# Patient Record
Sex: FEMALE | Race: WHITE | NOT HISPANIC OR LATINO | Employment: OTHER | URBAN - METROPOLITAN AREA
[De-identification: names, ages, dates, MRNs, and addresses within clinical notes are randomized per-mention and may not be internally consistent; named-entity substitution may affect disease eponyms.]

---

## 2017-04-06 ENCOUNTER — HISTORICAL (OUTPATIENT)
Dept: RADIOLOGY | Facility: HOSPITAL | Age: 52
End: 2017-04-06

## 2017-05-02 ENCOUNTER — HISTORICAL (OUTPATIENT)
Dept: RADIOLOGY | Facility: HOSPITAL | Age: 52
End: 2017-05-02

## 2017-05-09 ENCOUNTER — HISTORICAL (OUTPATIENT)
Dept: RADIOLOGY | Facility: HOSPITAL | Age: 52
End: 2017-05-09

## 2017-08-22 ENCOUNTER — HISTORICAL (OUTPATIENT)
Dept: INFUSION THERAPY | Facility: HOSPITAL | Age: 52
End: 2017-08-22

## 2017-08-23 ENCOUNTER — HISTORICAL (OUTPATIENT)
Dept: INFUSION THERAPY | Facility: HOSPITAL | Age: 52
End: 2017-08-23

## 2017-08-30 ENCOUNTER — HISTORICAL (OUTPATIENT)
Dept: INFUSION THERAPY | Facility: HOSPITAL | Age: 52
End: 2017-08-30

## 2017-09-12 ENCOUNTER — HISTORICAL (OUTPATIENT)
Dept: INFUSION THERAPY | Facility: HOSPITAL | Age: 52
End: 2017-09-12

## 2017-09-13 ENCOUNTER — HISTORICAL (OUTPATIENT)
Dept: INFUSION THERAPY | Facility: HOSPITAL | Age: 52
End: 2017-09-13

## 2017-09-19 ENCOUNTER — HISTORICAL (OUTPATIENT)
Dept: LAB | Facility: HOSPITAL | Age: 52
End: 2017-09-19

## 2017-10-03 ENCOUNTER — HISTORICAL (OUTPATIENT)
Dept: INFUSION THERAPY | Facility: HOSPITAL | Age: 52
End: 2017-10-03

## 2017-10-04 ENCOUNTER — HISTORICAL (OUTPATIENT)
Dept: INFUSION THERAPY | Facility: HOSPITAL | Age: 52
End: 2017-10-04

## 2017-10-24 ENCOUNTER — HISTORICAL (OUTPATIENT)
Dept: INFUSION THERAPY | Facility: HOSPITAL | Age: 52
End: 2017-10-24

## 2017-10-25 ENCOUNTER — HISTORICAL (OUTPATIENT)
Dept: INFUSION THERAPY | Facility: HOSPITAL | Age: 52
End: 2017-10-25

## 2017-12-01 ENCOUNTER — HISTORICAL (OUTPATIENT)
Dept: RADIOLOGY | Facility: HOSPITAL | Age: 52
End: 2017-12-01

## 2017-12-04 ENCOUNTER — HISTORICAL (OUTPATIENT)
Dept: INFUSION THERAPY | Facility: HOSPITAL | Age: 52
End: 2017-12-04

## 2017-12-04 LAB
ABS NEUT (OLG): 4.7 X10(3)/MCL (ref 1.5–6.9)
ALBUMIN SERPL-MCNC: 4.2 GM/DL (ref 3.4–5)
ALBUMIN/GLOB SERPL: 1.2 RATIO
ALP SERPL-CCNC: 70 UNIT/L (ref 30–113)
ALT SERPL-CCNC: 32 UNIT/L (ref 10–45)
AST SERPL-CCNC: 21 UNIT/L (ref 15–37)
BASOPHILS # BLD AUTO: 0.1 X10(3)/MCL (ref 0–0.1)
BASOPHILS NFR BLD AUTO: 1 % (ref 0–1)
BILIRUB SERPL-MCNC: 0.4 MG/DL (ref 0.1–0.9)
BILIRUBIN DIRECT+TOT PNL SERPL-MCNC: 0.2 MG/DL
BILIRUBIN DIRECT+TOT PNL SERPL-MCNC: 0.2 MG/DL (ref 0–0.3)
BUN SERPL-MCNC: 11 MG/DL (ref 10–20)
CALCIUM SERPL-MCNC: 9.1 MG/DL (ref 8–10.5)
CHLORIDE SERPL-SCNC: 102 MMOL/L (ref 100–108)
CO2 SERPL-SCNC: 30 MMOL/L (ref 21–35)
CREAT SERPL-MCNC: 0.48 MG/DL (ref 0.7–1.3)
EOSINOPHIL # BLD AUTO: 0.6 X10(3)/MCL (ref 0–0.6)
EOSINOPHIL NFR BLD AUTO: 8 % (ref 0–5)
ERYTHROCYTE [DISTWIDTH] IN BLOOD BY AUTOMATED COUNT: 13.6 % (ref 11.5–17)
GLOBULIN SER-MCNC: 3.4 GM/DL
GLUCOSE SERPL-MCNC: 64 MG/DL (ref 75–116)
HCT VFR BLD AUTO: 40.5 % (ref 36–48)
HGB BLD-MCNC: 13.4 GM/DL (ref 12–16)
LYMPHOCYTES # BLD AUTO: 1.5 X10(3)/MCL (ref 0.5–4.1)
LYMPHOCYTES NFR BLD AUTO: 20.2 % (ref 15–40)
MCH RBC QN AUTO: 31 PG (ref 27–34)
MCHC RBC AUTO-ENTMCNC: 33 GM/DL (ref 31–36)
MCV RBC AUTO: 93 FL (ref 80–99)
MONOCYTES # BLD AUTO: 0.6 X10(3)/MCL (ref 0–1.1)
MONOCYTES NFR BLD AUTO: 8 % (ref 4–12)
NEUTROPHILS # BLD AUTO: 4.7 X10(3)/MCL (ref 1.5–6.9)
NEUTROPHILS NFR BLD AUTO: 64 % (ref 43–75)
PLATELET # BLD AUTO: 295 X10(3)/MCL (ref 140–400)
PMV BLD AUTO: 9.1 FL (ref 6.8–10)
POTASSIUM SERPL-SCNC: 3.4 MMOL/L (ref 3.6–5.2)
PROT SERPL-MCNC: 7.6 GM/DL (ref 6.4–8.2)
RBC # BLD AUTO: 4.36 X10(6)/MCL (ref 4.2–5.4)
SODIUM SERPL-SCNC: 140 MMOL/L (ref 135–145)
WBC # SPEC AUTO: 7.4 X10(3)/MCL (ref 4.5–11.5)

## 2018-03-01 ENCOUNTER — HISTORICAL (OUTPATIENT)
Dept: LAB | Facility: HOSPITAL | Age: 53
End: 2018-03-01

## 2018-03-05 ENCOUNTER — HISTORICAL (OUTPATIENT)
Dept: INFUSION THERAPY | Facility: HOSPITAL | Age: 53
End: 2018-03-05

## 2018-05-29 ENCOUNTER — HISTORICAL (OUTPATIENT)
Dept: RADIOLOGY | Facility: HOSPITAL | Age: 53
End: 2018-05-29

## 2018-07-06 ENCOUNTER — HISTORICAL (OUTPATIENT)
Dept: RADIOLOGY | Facility: HOSPITAL | Age: 53
End: 2018-07-06

## 2018-08-14 ENCOUNTER — HISTORICAL (OUTPATIENT)
Dept: RADIOLOGY | Facility: HOSPITAL | Age: 53
End: 2018-08-14

## 2018-09-07 ENCOUNTER — HISTORICAL (OUTPATIENT)
Dept: INFUSION THERAPY | Facility: HOSPITAL | Age: 53
End: 2018-09-07

## 2018-12-11 ENCOUNTER — HISTORICAL (OUTPATIENT)
Dept: LAB | Facility: HOSPITAL | Age: 53
End: 2018-12-11

## 2019-01-15 ENCOUNTER — HISTORICAL (OUTPATIENT)
Dept: RADIOLOGY | Facility: HOSPITAL | Age: 54
End: 2019-01-15

## 2019-03-15 ENCOUNTER — HISTORICAL (OUTPATIENT)
Dept: INFUSION THERAPY | Facility: HOSPITAL | Age: 54
End: 2019-03-15

## 2019-03-15 ENCOUNTER — HISTORICAL (OUTPATIENT)
Dept: LAB | Facility: HOSPITAL | Age: 54
End: 2019-03-15

## 2019-06-12 ENCOUNTER — HISTORICAL (OUTPATIENT)
Dept: LAB | Facility: HOSPITAL | Age: 54
End: 2019-06-12

## 2019-06-13 ENCOUNTER — HISTORICAL (OUTPATIENT)
Dept: INFUSION THERAPY | Facility: HOSPITAL | Age: 54
End: 2019-06-13

## 2019-06-18 ENCOUNTER — HISTORICAL (OUTPATIENT)
Dept: ADMINISTRATIVE | Facility: HOSPITAL | Age: 54
End: 2019-06-18

## 2019-07-17 ENCOUNTER — HISTORICAL (OUTPATIENT)
Dept: RADIOLOGY | Facility: HOSPITAL | Age: 54
End: 2019-07-17

## 2019-09-13 ENCOUNTER — HISTORICAL (OUTPATIENT)
Dept: RADIOLOGY | Facility: HOSPITAL | Age: 54
End: 2019-09-13

## 2019-09-13 LAB
ABS NEUT (OLG): 6.7 X10(3)/MCL (ref 1.5–6.9)
ALBUMIN SERPL-MCNC: 4.3 GM/DL (ref 3.4–5)
ALBUMIN/GLOB SERPL: 1.1 RATIO
ALP SERPL-CCNC: 61 UNIT/L (ref 30–113)
ALT SERPL-CCNC: 19 UNIT/L (ref 10–45)
AST SERPL-CCNC: 19 UNIT/L (ref 15–37)
BASOPHILS # BLD AUTO: 0.1 X10(3)/MCL (ref 0–0.1)
BASOPHILS NFR BLD AUTO: 1 % (ref 0–1)
BILIRUB SERPL-MCNC: 0.3 MG/DL (ref 0.1–0.9)
BILIRUBIN DIRECT+TOT PNL SERPL-MCNC: 0.1 MG/DL (ref 0–0.3)
BILIRUBIN DIRECT+TOT PNL SERPL-MCNC: 0.2 MG/DL
BUN SERPL-MCNC: 10 MG/DL (ref 10–20)
CALCIUM SERPL-MCNC: 9.8 MG/DL (ref 8–10.5)
CHLORIDE SERPL-SCNC: 99 MMOL/L (ref 100–108)
CO2 SERPL-SCNC: 30 MMOL/L (ref 21–35)
CREAT SERPL-MCNC: 0.52 MG/DL (ref 0.7–1.3)
EOSINOPHIL # BLD AUTO: 0.2 X10(3)/MCL (ref 0–0.6)
EOSINOPHIL NFR BLD AUTO: 2 % (ref 0–5)
ERYTHROCYTE [DISTWIDTH] IN BLOOD BY AUTOMATED COUNT: 14.6 % (ref 11.5–17)
GLOBULIN SER-MCNC: 3.8 GM/DL
GLUCOSE SERPL-MCNC: 78 MG/DL (ref 75–116)
HCT VFR BLD AUTO: 47.9 % (ref 36–48)
HGB BLD-MCNC: 16 GM/DL (ref 12–16)
IMM GRANULOCYTES # BLD AUTO: 0.03 10*3/UL (ref 0–0.02)
IMM GRANULOCYTES NFR BLD AUTO: 0.3 % (ref 0–0.43)
LYMPHOCYTES # BLD AUTO: 2.2 X10(3)/MCL (ref 0.5–4.1)
LYMPHOCYTES NFR BLD AUTO: 22 % (ref 15–40)
MCH RBC QN AUTO: 30 PG (ref 27–34)
MCHC RBC AUTO-ENTMCNC: 33 GM/DL (ref 31–36)
MCV RBC AUTO: 90 FL (ref 80–99)
MONOCYTES # BLD AUTO: 0.6 X10(3)/MCL (ref 0–1.1)
MONOCYTES NFR BLD AUTO: 6 % (ref 4–12)
NEUTROPHILS # BLD AUTO: 6.7 X10(3)/MCL (ref 1.5–6.9)
NEUTROPHILS NFR BLD AUTO: 68 % (ref 43–75)
PLATELET # BLD AUTO: 304 X10(3)/MCL (ref 140–400)
PMV BLD AUTO: 9.3 FL (ref 6.8–10)
POTASSIUM SERPL-SCNC: 3.8 MMOL/L (ref 3.6–5.2)
PROT SERPL-MCNC: 8.1 GM/DL (ref 6.4–8.2)
RBC # BLD AUTO: 5.32 X10(6)/MCL (ref 4.2–5.4)
SODIUM SERPL-SCNC: 137 MMOL/L (ref 135–145)
WBC # SPEC AUTO: 9.9 X10(3)/MCL (ref 4.5–11.5)

## 2019-09-17 ENCOUNTER — HISTORICAL (OUTPATIENT)
Dept: INFUSION THERAPY | Facility: HOSPITAL | Age: 54
End: 2019-09-17

## 2019-12-16 ENCOUNTER — HISTORICAL (OUTPATIENT)
Dept: RADIOLOGY | Facility: HOSPITAL | Age: 54
End: 2019-12-16

## 2019-12-19 ENCOUNTER — HISTORICAL (OUTPATIENT)
Dept: INFUSION THERAPY | Facility: HOSPITAL | Age: 54
End: 2019-12-19

## 2020-03-17 ENCOUNTER — HISTORICAL (OUTPATIENT)
Dept: RADIOLOGY | Facility: HOSPITAL | Age: 55
End: 2020-03-17

## 2020-03-17 LAB — POC CREATININE: 0.5 MG/DL (ref 0.6–1.3)

## 2020-03-25 ENCOUNTER — HISTORICAL (OUTPATIENT)
Dept: LAB | Facility: HOSPITAL | Age: 55
End: 2020-03-25

## 2020-03-25 LAB
ABS NEUT (OLG): 6.2 X10(3)/MCL (ref 1.5–6.9)
ALBUMIN SERPL-MCNC: 4.3 GM/DL (ref 3.4–5)
ALBUMIN/GLOB SERPL: 1.3 RATIO
ALP SERPL-CCNC: 65 UNIT/L (ref 30–113)
ALT SERPL-CCNC: 25 UNIT/L (ref 10–45)
AST SERPL-CCNC: 19 UNIT/L (ref 15–37)
BASOPHILS # BLD AUTO: 0.1 X10(3)/MCL (ref 0–0.1)
BASOPHILS NFR BLD AUTO: 1 % (ref 0–1)
BILIRUB SERPL-MCNC: 0.3 MG/DL (ref 0.1–0.9)
BILIRUBIN DIRECT+TOT PNL SERPL-MCNC: 0.1 MG/DL (ref 0–0.3)
BILIRUBIN DIRECT+TOT PNL SERPL-MCNC: 0.2 MG/DL
BUN SERPL-MCNC: 8 MG/DL (ref 10–20)
CALCIUM SERPL-MCNC: 8.9 MG/DL (ref 8–10.5)
CHLORIDE SERPL-SCNC: 102 MMOL/L (ref 100–108)
CO2 SERPL-SCNC: 28 MMOL/L (ref 21–35)
CREAT SERPL-MCNC: 0.88 MG/DL (ref 0.7–1.3)
EOSINOPHIL # BLD AUTO: 0.3 X10(3)/MCL (ref 0–0.6)
EOSINOPHIL NFR BLD AUTO: 3 % (ref 0–5)
ERYTHROCYTE [DISTWIDTH] IN BLOOD BY AUTOMATED COUNT: 14.2 % (ref 11.5–17)
GLOBULIN SER-MCNC: 3.2 GM/DL
GLUCOSE SERPL-MCNC: 86 MG/DL (ref 75–116)
HCT VFR BLD AUTO: 44.2 % (ref 36–48)
HGB BLD-MCNC: 14.6 GM/DL (ref 12–16)
IMM GRANULOCYTES # BLD AUTO: 0.03 10*3/UL (ref 0–0.02)
IMM GRANULOCYTES NFR BLD AUTO: 0.3 % (ref 0–0.43)
LYMPHOCYTES # BLD AUTO: 2.2 X10(3)/MCL (ref 0.5–4.1)
LYMPHOCYTES NFR BLD AUTO: 24 % (ref 15–40)
MCH RBC QN AUTO: 31 PG (ref 27–34)
MCHC RBC AUTO-ENTMCNC: 33 GM/DL (ref 31–36)
MCV RBC AUTO: 93 FL (ref 80–99)
MONOCYTES # BLD AUTO: 0.6 X10(3)/MCL (ref 0–1.1)
MONOCYTES NFR BLD AUTO: 6 % (ref 4–12)
NEUTROPHILS # BLD AUTO: 6.2 X10(3)/MCL (ref 1.5–6.9)
NEUTROPHILS NFR BLD AUTO: 66 % (ref 43–75)
PLATELET # BLD AUTO: 301 X10(3)/MCL (ref 140–400)
PMV BLD AUTO: 8.6 FL (ref 6.8–10)
POTASSIUM SERPL-SCNC: 3.5 MMOL/L (ref 3.6–5.2)
PROT SERPL-MCNC: 7.5 GM/DL (ref 6.4–8.2)
RBC # BLD AUTO: 4.74 X10(6)/MCL (ref 4.2–5.4)
SODIUM SERPL-SCNC: 137 MMOL/L (ref 135–145)
WBC # SPEC AUTO: 9.4 X10(3)/MCL (ref 4.5–11.5)

## 2020-04-27 ENCOUNTER — HISTORICAL (OUTPATIENT)
Dept: INFUSION THERAPY | Facility: HOSPITAL | Age: 55
End: 2020-04-27

## 2020-10-22 ENCOUNTER — HISTORICAL (OUTPATIENT)
Dept: LAB | Facility: HOSPITAL | Age: 55
End: 2020-10-22

## 2020-10-22 LAB
ABS NEUT (OLG): 7.2 X10(3)/MCL (ref 1.5–6.9)
ALBUMIN SERPL-MCNC: 4.2 GM/DL (ref 3.5–5)
ALBUMIN/GLOB SERPL: 1.4 RATIO (ref 1.1–2)
ALP SERPL-CCNC: 54 UNIT/L (ref 40–150)
ALT SERPL-CCNC: 18 UNIT/L (ref 0–55)
AST SERPL-CCNC: 20 UNIT/L (ref 5–34)
BASOPHILS # BLD AUTO: 0.1 X10(3)/MCL (ref 0–0.1)
BASOPHILS NFR BLD AUTO: 1 % (ref 0–1)
BILIRUB SERPL-MCNC: 0.2 MG/DL
BILIRUBIN DIRECT+TOT PNL SERPL-MCNC: 0.1 MG/DL (ref 0–0.5)
BILIRUBIN DIRECT+TOT PNL SERPL-MCNC: 0.1 MG/DL (ref 0–0.8)
BUN SERPL-MCNC: 6 MG/DL (ref 9.8–20.1)
CALCIUM SERPL-MCNC: 10 MG/DL (ref 8.4–10.2)
CHLORIDE SERPL-SCNC: 100 MMOL/L (ref 98–107)
CO2 SERPL-SCNC: 27 MMOL/L (ref 22–29)
CREAT SERPL-MCNC: 0.71 MG/DL (ref 0.55–1.02)
EOSINOPHIL # BLD AUTO: 0.2 X10(3)/MCL (ref 0–0.6)
EOSINOPHIL NFR BLD AUTO: 2 % (ref 0–5)
ERYTHROCYTE [DISTWIDTH] IN BLOOD BY AUTOMATED COUNT: 13.5 % (ref 11.5–17)
GLOBULIN SER-MCNC: 3.1 GM/DL (ref 2.4–3.5)
GLUCOSE SERPL-MCNC: 60 MG/DL (ref 74–100)
HCT VFR BLD AUTO: 46.2 % (ref 36–48)
HGB BLD-MCNC: 15.2 GM/DL (ref 12–16)
IMM GRANULOCYTES # BLD AUTO: 0.06 10*3/UL (ref 0–0.02)
IMM GRANULOCYTES NFR BLD AUTO: 0.5 % (ref 0–0.43)
LYMPHOCYTES # BLD AUTO: 3.1 X10(3)/MCL (ref 0.5–4.1)
LYMPHOCYTES NFR BLD AUTO: 27 % (ref 15–40)
MCH RBC QN AUTO: 30 PG (ref 27–34)
MCHC RBC AUTO-ENTMCNC: 33 GM/DL (ref 31–36)
MCV RBC AUTO: 93 FL (ref 80–99)
MONOCYTES # BLD AUTO: 0.9 X10(3)/MCL (ref 0–1.1)
MONOCYTES NFR BLD AUTO: 8 % (ref 4–12)
NEUTROPHILS # BLD AUTO: 7.2 X10(3)/MCL (ref 1.5–6.9)
NEUTROPHILS NFR BLD AUTO: 62 % (ref 43–75)
PLATELET # BLD AUTO: 298 X10(3)/MCL (ref 140–400)
PMV BLD AUTO: 9 FL (ref 6.8–10)
POTASSIUM SERPL-SCNC: 4.5 MMOL/L (ref 3.5–5.1)
PROT SERPL-MCNC: 7.3 GM/DL (ref 6.4–8.3)
RBC # BLD AUTO: 4.99 X10(6)/MCL (ref 4.2–5.4)
SODIUM SERPL-SCNC: 138 MMOL/L (ref 136–145)
WBC # SPEC AUTO: 11.5 X10(3)/MCL (ref 4.5–11.5)

## 2020-10-26 ENCOUNTER — HISTORICAL (OUTPATIENT)
Dept: INFUSION THERAPY | Facility: HOSPITAL | Age: 55
End: 2020-10-26

## 2020-10-26 LAB — TSH SERPL-ACNC: 1.34 UIU/ML (ref 0.35–4.94)

## 2020-11-06 ENCOUNTER — HISTORICAL (OUTPATIENT)
Dept: RADIOLOGY | Facility: HOSPITAL | Age: 55
End: 2020-11-06

## 2020-11-10 ENCOUNTER — HISTORICAL (OUTPATIENT)
Dept: INFUSION THERAPY | Facility: HOSPITAL | Age: 55
End: 2020-11-10

## 2020-11-10 LAB
APPEARANCE, UA: CLEAR
BACTERIA SPEC CULT: NORMAL /HPF
BILIRUB UR QL STRIP: NEGATIVE
COLOR UR: YELLOW
GLUCOSE (UA): NEGATIVE MG/DL
HGB UR QL STRIP: NORMAL
KETONES UR QL STRIP: NEGATIVE MG/DL
LEUKOCYTE ESTERASE UR QL STRIP: NEGATIVE
NITRITE UR QL STRIP: NEGATIVE
PH UR STRIP: 5.5 [PH] (ref 4.6–8)
PROT UR QL STRIP: NEGATIVE MG/DL
RBC #/AREA URNS HPF: NORMAL /[HPF]
SP GR UR STRIP: <=1.005 (ref 1–1.03)
SQUAMOUS EPITHELIAL, UA: NORMAL /LPF
UROBILINOGEN UR STRIP-ACNC: 0.2 EU/DL
WBC #/AREA URNS HPF: NORMAL /[HPF]

## 2020-11-30 ENCOUNTER — HISTORICAL (OUTPATIENT)
Dept: RADIOLOGY | Facility: HOSPITAL | Age: 55
End: 2020-11-30

## 2020-12-01 ENCOUNTER — HISTORICAL (OUTPATIENT)
Dept: RADIOLOGY | Facility: HOSPITAL | Age: 55
End: 2020-12-01

## 2020-12-16 ENCOUNTER — HISTORICAL (OUTPATIENT)
Dept: RADIOLOGY | Facility: HOSPITAL | Age: 55
End: 2020-12-16

## 2021-04-21 ENCOUNTER — HISTORICAL (OUTPATIENT)
Dept: LAB | Facility: HOSPITAL | Age: 56
End: 2021-04-21

## 2021-04-21 LAB
ABS NEUT (OLG): 4.2 X10(3)/MCL (ref 1.5–6.9)
ALBUMIN SERPL-MCNC: 4.1 GM/DL (ref 3.5–5)
ALBUMIN/GLOB SERPL: 1.3 RATIO (ref 1.1–2)
ALP SERPL-CCNC: 54 UNIT/L (ref 40–150)
ALT SERPL-CCNC: 18 UNIT/L (ref 0–55)
AST SERPL-CCNC: 22 UNIT/L (ref 5–34)
BASOPHILS # BLD AUTO: 0.1 X10(3)/MCL (ref 0–0.1)
BASOPHILS NFR BLD AUTO: 1 % (ref 0–1)
BILIRUB SERPL-MCNC: 0.3 MG/DL
BILIRUBIN DIRECT+TOT PNL SERPL-MCNC: 0.1 MG/DL (ref 0–0.5)
BILIRUBIN DIRECT+TOT PNL SERPL-MCNC: 0.2 MG/DL (ref 0–0.8)
BUN SERPL-MCNC: 11 MG/DL (ref 9.8–20.1)
CALCIUM SERPL-MCNC: 9.2 MG/DL (ref 8.4–10.2)
CHLORIDE SERPL-SCNC: 104 MMOL/L (ref 98–107)
CO2 SERPL-SCNC: 28 MMOL/L (ref 22–29)
CREAT SERPL-MCNC: 0.77 MG/DL (ref 0.55–1.02)
EOSINOPHIL # BLD AUTO: 0.2 X10(3)/MCL (ref 0–0.6)
EOSINOPHIL NFR BLD AUTO: 3 % (ref 0–5)
ERYTHROCYTE [DISTWIDTH] IN BLOOD BY AUTOMATED COUNT: 13 % (ref 11.5–17)
GLOBULIN SER-MCNC: 3.1 GM/DL (ref 2.4–3.5)
GLUCOSE SERPL-MCNC: 102 MG/DL (ref 74–100)
HCT VFR BLD AUTO: 37.9 % (ref 36–48)
HGB BLD-MCNC: 12.4 GM/DL (ref 12–16)
IMM GRANULOCYTES # BLD AUTO: 0.03 10*3/UL (ref 0–0.02)
IMM GRANULOCYTES NFR BLD AUTO: 0.4 % (ref 0–0.43)
LYMPHOCYTES # BLD AUTO: 3.2 X10(3)/MCL (ref 0.5–4.1)
LYMPHOCYTES NFR BLD AUTO: 38 % (ref 15–40)
MAGNESIUM SERPL-MCNC: 2.1 MG/DL (ref 1.6–2.6)
MCH RBC QN AUTO: 31 PG (ref 27–34)
MCHC RBC AUTO-ENTMCNC: 33 GM/DL (ref 31–36)
MCV RBC AUTO: 94 FL (ref 80–99)
MONOCYTES # BLD AUTO: 0.6 X10(3)/MCL (ref 0–1.1)
MONOCYTES NFR BLD AUTO: 7 % (ref 4–12)
NEUTROPHILS # BLD AUTO: 4.2 X10(3)/MCL (ref 1.5–6.9)
NEUTROPHILS NFR BLD AUTO: 50 % (ref 43–75)
PHOSPHATE SERPL-MCNC: 3.5 MG/DL (ref 2.3–4.7)
PLATELET # BLD AUTO: 290 X10(3)/MCL (ref 140–400)
PMV BLD AUTO: 9.1 FL (ref 6.8–10)
POTASSIUM SERPL-SCNC: 3.4 MMOL/L (ref 3.5–5.1)
PROT SERPL-MCNC: 7.2 GM/DL (ref 6.4–8.3)
RBC # BLD AUTO: 4.04 X10(6)/MCL (ref 4.2–5.4)
SODIUM SERPL-SCNC: 140 MMOL/L (ref 136–145)
WBC # SPEC AUTO: 8.4 X10(3)/MCL (ref 4.5–11.5)

## 2021-04-27 ENCOUNTER — HISTORICAL (OUTPATIENT)
Dept: INFUSION THERAPY | Facility: HOSPITAL | Age: 56
End: 2021-04-27

## 2021-06-16 ENCOUNTER — HISTORICAL (OUTPATIENT)
Dept: RADIOLOGY | Facility: HOSPITAL | Age: 56
End: 2021-06-16

## 2021-10-25 ENCOUNTER — HISTORICAL (OUTPATIENT)
Dept: LAB | Facility: HOSPITAL | Age: 56
End: 2021-10-25

## 2021-10-25 LAB
ABS NEUT (OLG): 5.6 X10(3)/MCL (ref 1.5–6.9)
ALBUMIN SERPL-MCNC: 4.7 GM/DL (ref 3.5–5)
ALBUMIN/GLOB SERPL: 1.3 RATIO (ref 1.1–2)
ALP SERPL-CCNC: 68 UNIT/L (ref 40–150)
ALT SERPL-CCNC: 18 UNIT/L (ref 0–55)
AST SERPL-CCNC: 27 UNIT/L (ref 5–34)
BASOPHILS # BLD AUTO: 0.1 X10(3)/MCL (ref 0–0.1)
BASOPHILS NFR BLD AUTO: 1 % (ref 0–1)
BILIRUB SERPL-MCNC: 0.4 MG/DL
BILIRUBIN DIRECT+TOT PNL SERPL-MCNC: 0.1 MG/DL (ref 0–0.5)
BILIRUBIN DIRECT+TOT PNL SERPL-MCNC: 0.3 MG/DL (ref 0–0.8)
BUN SERPL-MCNC: 9 MG/DL (ref 9.8–20.1)
CALCIUM SERPL-MCNC: 10.8 MG/DL (ref 8.4–10.2)
CHLORIDE SERPL-SCNC: 102 MMOL/L (ref 98–107)
CO2 SERPL-SCNC: 27 MMOL/L (ref 22–29)
CREAT SERPL-MCNC: 0.77 MG/DL (ref 0.55–1.02)
EOSINOPHIL # BLD AUTO: 0.2 X10(3)/MCL (ref 0–0.6)
EOSINOPHIL NFR BLD AUTO: 2 % (ref 0–5)
ERYTHROCYTE [DISTWIDTH] IN BLOOD BY AUTOMATED COUNT: 13 % (ref 11.5–17)
GLOBULIN SER-MCNC: 3.6 GM/DL (ref 2.4–3.5)
GLUCOSE SERPL-MCNC: 95 MG/DL (ref 74–100)
HCT VFR BLD AUTO: 46.7 % (ref 36–48)
HGB BLD-MCNC: 15.2 GM/DL (ref 12–16)
IMM GRANULOCYTES # BLD AUTO: 0.02 10*3/UL (ref 0–0.02)
IMM GRANULOCYTES NFR BLD AUTO: 0.2 % (ref 0–0.43)
LYMPHOCYTES # BLD AUTO: 3.3 X10(3)/MCL (ref 0.5–4.1)
LYMPHOCYTES NFR BLD AUTO: 34 % (ref 15–40)
MAGNESIUM SERPL-MCNC: 2.1 MG/DL (ref 1.6–2.6)
MCH RBC QN AUTO: 30 PG (ref 27–34)
MCHC RBC AUTO-ENTMCNC: 32 GM/DL (ref 31–36)
MCV RBC AUTO: 93 FL (ref 80–99)
MONOCYTES # BLD AUTO: 0.6 X10(3)/MCL (ref 0–1.1)
MONOCYTES NFR BLD AUTO: 6 % (ref 4–12)
NEUTROPHILS # BLD AUTO: 5.6 X10(3)/MCL (ref 1.5–6.9)
NEUTROPHILS NFR BLD AUTO: 57 % (ref 43–75)
PLATELET # BLD AUTO: 339 X10(3)/MCL (ref 140–400)
PMV BLD AUTO: 9.3 FL (ref 6.8–10)
POTASSIUM SERPL-SCNC: 4.5 MMOL/L (ref 3.5–5.1)
PROT SERPL-MCNC: 8.3 GM/DL (ref 6.4–8.3)
RBC # BLD AUTO: 5.03 X10(6)/MCL (ref 4.2–5.4)
SODIUM SERPL-SCNC: 139 MMOL/L (ref 136–145)
WBC # SPEC AUTO: 9.8 X10(3)/MCL (ref 4.5–11.5)

## 2021-10-29 ENCOUNTER — HISTORICAL (OUTPATIENT)
Dept: INFUSION THERAPY | Facility: HOSPITAL | Age: 56
End: 2021-10-29

## 2021-11-10 ENCOUNTER — HISTORICAL (OUTPATIENT)
Dept: RADIOLOGY | Facility: HOSPITAL | Age: 56
End: 2021-11-10

## 2021-12-17 ENCOUNTER — HISTORICAL (OUTPATIENT)
Dept: RADIOLOGY | Facility: HOSPITAL | Age: 56
End: 2021-12-17

## 2021-12-29 ENCOUNTER — HISTORICAL (OUTPATIENT)
Dept: RADIOLOGY | Facility: HOSPITAL | Age: 56
End: 2021-12-29

## 2022-01-10 ENCOUNTER — HISTORICAL (OUTPATIENT)
Dept: RADIOLOGY | Facility: HOSPITAL | Age: 57
End: 2022-01-10

## 2022-01-18 ENCOUNTER — HISTORICAL (OUTPATIENT)
Dept: RADIOLOGY | Facility: HOSPITAL | Age: 57
End: 2022-01-18

## 2022-02-07 ENCOUNTER — HISTORICAL (OUTPATIENT)
Dept: LAB | Facility: HOSPITAL | Age: 57
End: 2022-02-07

## 2022-02-07 LAB — AFP-TM SERPL-MCNC: 5.4 NG/ML

## 2022-04-25 ENCOUNTER — HISTORICAL (OUTPATIENT)
Dept: LAB | Facility: HOSPITAL | Age: 57
End: 2022-04-25
Payer: MEDICARE

## 2022-04-25 LAB
ABS NEUT (OLG): 5.6 (ref 1.5–6.9)
ALBUMIN SERPL-MCNC: 4.2 G/DL (ref 3.5–5)
ALBUMIN/GLOB SERPL: 1.6 {RATIO} (ref 1.1–2)
ALP SERPL-CCNC: 51 U/L (ref 40–150)
ALT SERPL-CCNC: 18 U/L (ref 0–55)
AST SERPL-CCNC: 24 U/L (ref 5–34)
BASOPHILS # BLD AUTO: 0.1 10*3/UL (ref 0–0.1)
BASOPHILS NFR BLD AUTO: 1 % (ref 0–1)
BILIRUB SERPL-MCNC: 0.3 MG/DL
BILIRUBIN DIRECT+TOT PNL SERPL-MCNC: 0.1 (ref 0–0.5)
BILIRUBIN DIRECT+TOT PNL SERPL-MCNC: 0.2 (ref 0–0.8)
BUN SERPL-MCNC: 11 MG/DL (ref 9.8–20.1)
CALCIUM SERPL-MCNC: 10 MG/DL (ref 8.7–10.5)
CHLORIDE SERPL-SCNC: 102 MMOL/L (ref 98–107)
CO2 SERPL-SCNC: 27 MMOL/L (ref 22–29)
CREAT SERPL-MCNC: 0.69 MG/DL (ref 0.55–1.02)
EOSINOPHIL # BLD AUTO: 0.2 10*3/UL (ref 0–0.6)
EOSINOPHIL NFR BLD AUTO: 2 % (ref 0–5)
ERYTHROCYTE [DISTWIDTH] IN BLOOD BY AUTOMATED COUNT: 13.4 % (ref 11.5–17)
GLOBULIN SER-MCNC: 2.6 G/DL (ref 2.4–3.5)
GLUCOSE SERPL-MCNC: 86 MG/DL (ref 74–100)
HCT VFR BLD AUTO: 39.4 % (ref 36–48)
HEMOLYSIS INTERF INDEX SERPL-ACNC: 9
HGB BLD-MCNC: 13 G/DL (ref 12–16)
ICTERIC INTERF INDEX SERPL-ACNC: 0
IMM GRANULOCYTES # BLD AUTO: 0.04 10*3/UL (ref 0–0.02)
IMM GRANULOCYTES NFR BLD AUTO: 0.4 % (ref 0–0.43)
LIPEMIC INTERF INDEX SERPL-ACNC: 3
LYMPHOCYTES # BLD AUTO: 2.7 10*3/UL (ref 0.5–4.1)
LYMPHOCYTES NFR BLD AUTO: 29 % (ref 15–40)
MANUAL DIFF? (OHS): NO
MCH RBC QN AUTO: 30 PG (ref 27–34)
MCHC RBC AUTO-ENTMCNC: 33 G/DL (ref 31–36)
MCV RBC AUTO: 92 FL (ref 80–99)
MONOCYTES # BLD AUTO: 0.7 10*3/UL (ref 0–1.1)
MONOCYTES NFR BLD AUTO: 7 % (ref 4–12)
NEUTROPHILS # BLD AUTO: 5.6 10*3/UL (ref 1.5–6.9)
NEUTROPHILS NFR BLD AUTO: 60 % (ref 43–75)
PLATELET # BLD AUTO: 291 10*3/UL (ref 140–400)
PMV BLD AUTO: 9 FL (ref 6.8–10)
POTASSIUM SERPL-SCNC: 4.1 MMOL/L (ref 3.5–5.1)
PROT SERPL-MCNC: 6.8 G/DL (ref 6.4–8.3)
RBC # BLD AUTO: 4.27 10*6/UL (ref 4.2–5.4)
SODIUM SERPL-SCNC: 138 MMOL/L (ref 136–145)
WBC # SPEC AUTO: 9.2 10*3/UL (ref 4.5–11.5)

## 2022-04-26 DIAGNOSIS — D75.1 POLYCYTHEMIA, SECONDARY: Primary | ICD-10-CM

## 2022-04-26 DIAGNOSIS — E46 MALNUTRITION, CALORIE: ICD-10-CM

## 2022-04-27 DIAGNOSIS — M85.80 OSTEOPENIA, UNSPECIFIED LOCATION: ICD-10-CM

## 2022-05-02 ENCOUNTER — INFUSION (OUTPATIENT)
Dept: INFUSION THERAPY | Facility: HOSPITAL | Age: 57
End: 2022-05-02
Payer: MEDICARE

## 2022-05-02 ENCOUNTER — OFFICE VISIT (OUTPATIENT)
Dept: HEMATOLOGY/ONCOLOGY | Facility: CLINIC | Age: 57
End: 2022-05-02
Payer: MEDICARE

## 2022-05-02 VITALS
TEMPERATURE: 98 F | SYSTOLIC BLOOD PRESSURE: 143 MMHG | HEART RATE: 87 BPM | OXYGEN SATURATION: 98 % | HEIGHT: 62 IN | BODY MASS INDEX: 15.99 KG/M2 | RESPIRATION RATE: 18 BRPM | DIASTOLIC BLOOD PRESSURE: 75 MMHG | WEIGHT: 86.88 LBS

## 2022-05-02 VITALS
BODY MASS INDEX: 14.77 KG/M2 | RESPIRATION RATE: 18 BRPM | HEIGHT: 62 IN | TEMPERATURE: 98 F | HEART RATE: 87 BPM | DIASTOLIC BLOOD PRESSURE: 75 MMHG | SYSTOLIC BLOOD PRESSURE: 143 MMHG | WEIGHT: 80.25 LBS | OXYGEN SATURATION: 98 %

## 2022-05-02 DIAGNOSIS — M85.80 OSTEOPENIA, UNSPECIFIED LOCATION: Primary | ICD-10-CM

## 2022-05-02 DIAGNOSIS — C50.919 MALIGNANT NEOPLASM OF BREAST IN FEMALE, ESTROGEN RECEPTOR POSITIVE, UNSPECIFIED LATERALITY, UNSPECIFIED SITE OF BREAST: Primary | ICD-10-CM

## 2022-05-02 DIAGNOSIS — C50.811 MALIGNANT NEOPLASM OF OVERLAPPING SITES OF RIGHT BREAST IN FEMALE, ESTROGEN RECEPTOR POSITIVE: ICD-10-CM

## 2022-05-02 DIAGNOSIS — Z17.0 MALIGNANT NEOPLASM OF BREAST IN FEMALE, ESTROGEN RECEPTOR POSITIVE, UNSPECIFIED LATERALITY, UNSPECIFIED SITE OF BREAST: Primary | ICD-10-CM

## 2022-05-02 DIAGNOSIS — Z17.0 MALIGNANT NEOPLASM OF OVERLAPPING SITES OF RIGHT BREAST IN FEMALE, ESTROGEN RECEPTOR POSITIVE: ICD-10-CM

## 2022-05-02 PROCEDURE — 99213 PR OFFICE/OUTPT VISIT, EST, LEVL III, 20-29 MIN: ICD-10-PCS | Mod: S$PBB,,,

## 2022-05-02 PROCEDURE — 96372 THER/PROPH/DIAG INJ SC/IM: CPT

## 2022-05-02 PROCEDURE — 99999 PR PBB SHADOW E&M-EST. PATIENT-LVL V: ICD-10-PCS | Mod: PBBFAC,,,

## 2022-05-02 PROCEDURE — 99999 PR PBB SHADOW E&M-EST. PATIENT-LVL V: CPT | Mod: PBBFAC,,,

## 2022-05-02 PROCEDURE — 99213 OFFICE O/P EST LOW 20 MIN: CPT | Mod: S$PBB,,,

## 2022-05-02 PROCEDURE — 63600175 PHARM REV CODE 636 W HCPCS: Mod: JG

## 2022-05-02 PROCEDURE — 99215 OFFICE O/P EST HI 40 MIN: CPT | Mod: PBBFAC,25

## 2022-05-02 RX ORDER — LETROZOLE 2.5 MG/1
2.5 TABLET, FILM COATED ORAL DAILY
COMMUNITY
Start: 2022-04-25 | End: 2022-08-04

## 2022-05-02 RX ORDER — MELOXICAM 7.5 MG/1
7.5 TABLET ORAL DAILY
COMMUNITY
Start: 2021-04-27 | End: 2024-03-28

## 2022-05-02 RX ORDER — CHOLESTYRAMINE 4 G/9G
1 POWDER, FOR SUSPENSION ORAL 2 TIMES DAILY
COMMUNITY
Start: 2022-04-01 | End: 2023-05-15

## 2022-05-02 RX ORDER — OMEGA-3-ACID ETHYL ESTERS 1 G/1
2 CAPSULE, LIQUID FILLED ORAL DAILY
COMMUNITY
End: 2022-11-07

## 2022-05-02 RX ADMIN — DENOSUMAB 60 MG: 60 INJECTION SUBCUTANEOUS at 03:05

## 2022-05-02 NOTE — PROGRESS NOTES
[facility] Hematology/Oncology  PROGRESS NOTE      Subjective:         NAME: Sarah Hagen : 1965     57 y.o. female   MRN: [id number]    Referring Doc: No ref. provider found  Other Physicians:    Chief Complaint:    Chief Complaint   Patient presents with    Breast Cancer     Pt reports fatigue and worsening joint pain       History of Present Illness:     1. Right upper outer breast cancer, T1N0M0, ER/VT positive. Her2 negative. Oncotype RS = 25   R breast biopsy 17: Invasive carcinoma, grade 1. R axillary LN biopsy negative for metastatic carcinoma.   ER 97%, VT 82% and Her2 negative   R breast lumpectomy and sentinel lymph node biopsy 17   Path: Invasive ductal carcinoma, well differentiated, measuring 1.1 cm. No LVI identified. DCIS identified. Margins negative. 0/3 lymph nodes with metastatic carcinoma.   Adjuvant TC x 4 cycles 16 - 10/24/17   Adjuvant radiation 11/15/17 - 18-- 45 Gy Dr. Kc   2. Osteopenia. On Prolia q 6 months 3/2018 -->   3. Acoustic neuroma   S/p surgery in [place] in 2018   Current Treatment:   Adjuvant Letrozole 2018       Interval History:  Ms. Hagen presents to clinic today for a 6-month follow-up visit for right breast cancer. Started adjuvant letrozole 2018.   Continues to take letrozole 1 tablet daily. Reports compliance. Takes medication with food states if she does not she will get dizzy. Reports history of acoustic neuroma. History of surgery where her hearing and balance nerves were removed. She does not perform self breast exams. Left diagnostic mammogram and ultrasound done on 2021. Negative results.   History of osteopenia. Prolia every 6 months. Takes daily vitamin D. Does not take calcium (causes stomach upset-burning). Denies jaw pain, tooth extractions or fractures.   Denies any joint pain, mood changes, vaginal dryness or muscle/joint pain with use of letrozole.     ROS:   GEN: normal without any fever, night  "sweats or weight loss  HEENT: normal with no HA's, sore throat, stiff neck, changes in vision  CV: normal with no CP, SOB, PND, NOWAK or orthopnea  PULM: normal with no SOB, cough, hemoptysis, sputum or pleuritic pain  GI: normal with no abdominal pain, nausea, vomiting, constipation, diarrhea, melanotic stools, BRBPR, or hematemesis  : normal with no hematuria, dysuria  BREAST: normal with no mass, discharge, pain  SKIN: normal with no rash, erythema, bruising, or swelling    Allergies:  Review of patient's allergies indicates:   Allergen Reactions    Sulfa (sulfonamide antibiotics)        Medications:    Current Outpatient Medications:     cholestyramine (QUESTRAN) 4 gram packet, Take 1 packet by mouth 2 (two) times a day., Disp: , Rfl:     letrozole (FEMARA) 2.5 mg Tab, Take 2.5 mg by mouth once daily., Disp: , Rfl:     meloxicam (MOBIC) 7.5 MG tablet, Take 7.5 mg by mouth once daily., Disp: , Rfl:     omega-3 acid ethyl esters (LOVAZA) 1 gram capsule, Take 2 g by mouth once daily., Disp: , Rfl:     PMHx/PSHx Updates:  See patient's last visit with me on Visit date not found.  See H&P on         Pathology:  Cancer Staging  No matching staging information was found for the patient.          Objective:     Vitals:  Blood pressure (!) 143/75, pulse 87, temperature 98.4 °F (36.9 °C), resp. rate 18, height 5' 1.97" (1.574 m), weight 39.4 kg (86 lb 13.8 oz), SpO2 98 %.    Physical Examination:   GEN: no apparent distress, comfortable; AAOx3  HEAD: atraumatic and normocephalic  EYES: no pallor, no icterus, PERRLA  ENT: OMM, no pharyngeal erythema, external ears WNL; no nasal discharge; no thrush  NECK: no masses, thyroid normal, trachea midline, no LAD/LN's, supple  CV: RRR with no murmur; normal pulse; normal S1 and S2; no pedal edema  CHEST: Normal respiratory effort; CTAB; normal breath sounds; no wheeze or crackles  ABDOM: nontender and nondistended; soft; normal bowel sounds; no " rebound/guarding  MUSC/Skeletal: ROM normal; no crepitus; joints normal; no deformities or arthropathy  EXTREM: no clubbing, cyanosis, inflammation or swelling  SKIN: no rashes, lesions, ulcers, petechiae or subcutaneous nodules  : no vela  NEURO: grossly intact; motor/sensory WNL; AAOx3; no tremors  PSYCH: normal mood, affect and behavior  LYMPH: normal cervical, supraclavicular, axillary and groin LN's      Labs:       Radiology/Diagnostic Studies:    No results found.    I have reviewed all available lab results and radiology reports.    Assessment/Plan:     1) Right breast cancer, T1N0M0, s/p lumpectomy and SLNB. Tumor was ER and LA positive and Her2 negative but unfortunately, pt's Oncotype RS came back at 25 which put her in the intermediate risk category. Because of this, adjuvant chemotherapy with Taxotere and Cytoxan x 4 cycles was recommended; she completed chemo on 10/24/17 and subsequently completed adjuvant radiation in early 1/2018. She started Femara in early February 2018 and continues to tolerate it well.  June 16,2021 left diagnostic [place] mammogram and limited US was negative for malignancy.    Additionally consideration of a yearly screening breast MRI (ideally 6 months following bilateral mammography). Recommendation for patient with a personal history of breast cancer and dense breast parenchyma. Ordered today.  2) Unintentional weight loss. CT CAP all unremarkable for malignancy. PapSmer negative. It has been recommended several times that she follow up with Dr. Plaza. She has not done this. States is not happy with Dr. Plaza. States he is doing nothing for me. She is currently working with PCP to establish w/ a new gastroenterologist. Is getting blood work at PCPs office on Monday. She will see PCP in the interim for any concerns/problems.   3) Osteopenia. On Prolia q 6 months since 3/2018. Discussed getting calcium through her diet, continue Vitamin D daily. Benefits of weight bearing  exercise reviewed. DEXA due in 12/2023.   4) Hypercalcemia - resolved.       PLAN:  RTC 6 months w/ NP  CMP prior to prolia    Discussion:     I have explained all of the above in detail and the patient understands all of the current recommendation(s). I have answered all of their questions to the best of my ability and to their complete satisfaction.   The patient is to continue with the current management plan.            Electronically signed by Jerrica Cristian NP

## 2022-06-16 ENCOUNTER — APPOINTMENT (OUTPATIENT)
Dept: RADIOLOGY | Facility: HOSPITAL | Age: 57
End: 2022-06-16
Payer: MEDICARE

## 2022-06-16 DIAGNOSIS — C50.919 MALIGNANT NEOPLASM OF BREAST IN FEMALE, ESTROGEN RECEPTOR POSITIVE, UNSPECIFIED LATERALITY, UNSPECIFIED SITE OF BREAST: ICD-10-CM

## 2022-06-16 DIAGNOSIS — Z17.0 MALIGNANT NEOPLASM OF BREAST IN FEMALE, ESTROGEN RECEPTOR POSITIVE, UNSPECIFIED LATERALITY, UNSPECIFIED SITE OF BREAST: ICD-10-CM

## 2022-06-16 PROCEDURE — 25500020 PHARM REV CODE 255

## 2022-06-16 PROCEDURE — 77049 MRI BREAST C-+ W/CAD BI: CPT | Mod: 26,,, | Performed by: STUDENT IN AN ORGANIZED HEALTH CARE EDUCATION/TRAINING PROGRAM

## 2022-06-16 PROCEDURE — 77049 MRI BREAST W/WO CONTRAST, W/CAD, BILATERAL: ICD-10-PCS | Mod: 26,,, | Performed by: STUDENT IN AN ORGANIZED HEALTH CARE EDUCATION/TRAINING PROGRAM

## 2022-06-16 PROCEDURE — 77049 MRI BREAST C-+ W/CAD BI: CPT | Mod: TC

## 2022-06-16 PROCEDURE — A9577 INJ MULTIHANCE: HCPCS

## 2022-06-16 RX ADMIN — GADOBENATE DIMEGLUMINE 10 ML: 529 INJECTION, SOLUTION INTRAVENOUS at 09:06

## 2022-06-17 LAB
CREAT SERPL-MCNC: 0.6 MG/DL (ref 0.5–1.4)
SAMPLE: NORMAL

## 2022-08-03 DIAGNOSIS — Z17.0 MALIGNANT NEOPLASM OF OVERLAPPING SITES OF RIGHT BREAST IN FEMALE, ESTROGEN RECEPTOR POSITIVE: Primary | ICD-10-CM

## 2022-08-03 DIAGNOSIS — C50.811 MALIGNANT NEOPLASM OF OVERLAPPING SITES OF RIGHT BREAST IN FEMALE, ESTROGEN RECEPTOR POSITIVE: Primary | ICD-10-CM

## 2022-08-04 RX ORDER — LETROZOLE 2.5 MG/1
TABLET, FILM COATED ORAL
Qty: 30 TABLET | Refills: 11 | Status: SHIPPED | OUTPATIENT
Start: 2022-08-04 | End: 2024-03-28

## 2022-11-03 DIAGNOSIS — C50.919 MALIGNANT NEOPLASM OF BREAST IN FEMALE, ESTROGEN RECEPTOR POSITIVE, UNSPECIFIED LATERALITY, UNSPECIFIED SITE OF BREAST: Primary | ICD-10-CM

## 2022-11-03 DIAGNOSIS — Z17.0 MALIGNANT NEOPLASM OF BREAST IN FEMALE, ESTROGEN RECEPTOR POSITIVE, UNSPECIFIED LATERALITY, UNSPECIFIED SITE OF BREAST: Primary | ICD-10-CM

## 2022-11-04 ENCOUNTER — LAB VISIT (OUTPATIENT)
Dept: LAB | Facility: HOSPITAL | Age: 57
End: 2022-11-04
Payer: MEDICARE

## 2022-11-04 DIAGNOSIS — Z17.0 MALIGNANT NEOPLASM OF BREAST IN FEMALE, ESTROGEN RECEPTOR POSITIVE, UNSPECIFIED LATERALITY, UNSPECIFIED SITE OF BREAST: ICD-10-CM

## 2022-11-04 DIAGNOSIS — C50.919 MALIGNANT NEOPLASM OF BREAST IN FEMALE, ESTROGEN RECEPTOR POSITIVE, UNSPECIFIED LATERALITY, UNSPECIFIED SITE OF BREAST: ICD-10-CM

## 2022-11-04 LAB
ALBUMIN SERPL-MCNC: 4.5 GM/DL (ref 3.5–5)
ALBUMIN/GLOB SERPL: 1.7 RATIO (ref 1.1–2)
ALP SERPL-CCNC: 53 UNIT/L (ref 40–150)
ALT SERPL-CCNC: 21 UNIT/L (ref 0–55)
AST SERPL-CCNC: 27 UNIT/L (ref 5–34)
BILIRUBIN DIRECT+TOT PNL SERPL-MCNC: 0.3 MG/DL
BUN SERPL-MCNC: 9 MG/DL (ref 9.8–20.1)
CALCIUM SERPL-MCNC: 10.3 MG/DL (ref 8.4–10.2)
CHLORIDE SERPL-SCNC: 103 MMOL/L (ref 98–107)
CO2 SERPL-SCNC: 28 MMOL/L (ref 22–29)
CREAT SERPL-MCNC: 0.81 MG/DL (ref 0.55–1.02)
GFR SERPLBLD CREATININE-BSD FMLA CKD-EPI: >60 MLS/MIN/1.73/M2
GLOBULIN SER-MCNC: 2.6 GM/DL (ref 2.4–3.5)
GLUCOSE SERPL-MCNC: 91 MG/DL (ref 74–100)
POTASSIUM SERPL-SCNC: 4.9 MMOL/L (ref 3.5–5.1)
PROT SERPL-MCNC: 7.1 GM/DL (ref 6.4–8.3)
SODIUM SERPL-SCNC: 141 MMOL/L (ref 136–145)

## 2022-11-04 PROCEDURE — 80053 COMPREHEN METABOLIC PANEL: CPT

## 2022-11-04 PROCEDURE — 36415 COLL VENOUS BLD VENIPUNCTURE: CPT

## 2022-11-07 ENCOUNTER — INFUSION (OUTPATIENT)
Dept: INFUSION THERAPY | Facility: HOSPITAL | Age: 57
End: 2022-11-07
Payer: MEDICARE

## 2022-11-07 ENCOUNTER — OFFICE VISIT (OUTPATIENT)
Dept: HEMATOLOGY/ONCOLOGY | Facility: CLINIC | Age: 57
End: 2022-11-07
Payer: MEDICARE

## 2022-11-07 ENCOUNTER — DOCUMENTATION ONLY (OUTPATIENT)
Dept: HEMATOLOGY/ONCOLOGY | Facility: CLINIC | Age: 57
End: 2022-11-07

## 2022-11-07 VITALS
DIASTOLIC BLOOD PRESSURE: 71 MMHG | RESPIRATION RATE: 18 BRPM | OXYGEN SATURATION: 98 % | TEMPERATURE: 98 F | HEIGHT: 62 IN | HEART RATE: 90 BPM | WEIGHT: 88 LBS | BODY MASS INDEX: 16.2 KG/M2 | SYSTOLIC BLOOD PRESSURE: 150 MMHG

## 2022-11-07 DIAGNOSIS — C50.919 MALIGNANT NEOPLASM OF BREAST IN FEMALE, ESTROGEN RECEPTOR POSITIVE, UNSPECIFIED LATERALITY, UNSPECIFIED SITE OF BREAST: Primary | ICD-10-CM

## 2022-11-07 DIAGNOSIS — M85.80 OSTEOPENIA, UNSPECIFIED LOCATION: Primary | ICD-10-CM

## 2022-11-07 DIAGNOSIS — C50.919 MALIGNANT NEOPLASM OF BREAST IN FEMALE, ESTROGEN RECEPTOR POSITIVE, UNSPECIFIED LATERALITY, UNSPECIFIED SITE OF BREAST: ICD-10-CM

## 2022-11-07 DIAGNOSIS — M85.80 OSTEOPENIA, UNSPECIFIED LOCATION: ICD-10-CM

## 2022-11-07 DIAGNOSIS — Z12.31 SCREENING MAMMOGRAM FOR HIGH-RISK PATIENT: Primary | ICD-10-CM

## 2022-11-07 DIAGNOSIS — Z17.0 MALIGNANT NEOPLASM OF BREAST IN FEMALE, ESTROGEN RECEPTOR POSITIVE, UNSPECIFIED LATERALITY, UNSPECIFIED SITE OF BREAST: ICD-10-CM

## 2022-11-07 DIAGNOSIS — Z17.0 MALIGNANT NEOPLASM OF BREAST IN FEMALE, ESTROGEN RECEPTOR POSITIVE, UNSPECIFIED LATERALITY, UNSPECIFIED SITE OF BREAST: Primary | ICD-10-CM

## 2022-11-07 PROCEDURE — 99215 PR OFFICE/OUTPT VISIT, EST, LEVL V, 40-54 MIN: ICD-10-PCS | Mod: S$PBB,,,

## 2022-11-07 PROCEDURE — 99215 OFFICE O/P EST HI 40 MIN: CPT | Mod: S$PBB,,,

## 2022-11-07 PROCEDURE — 99999 PR PBB SHADOW E&M-EST. PATIENT-LVL IV: ICD-10-PCS | Mod: PBBFAC,,,

## 2022-11-07 PROCEDURE — 99214 OFFICE O/P EST MOD 30 MIN: CPT | Mod: PBBFAC

## 2022-11-07 PROCEDURE — 63600175 PHARM REV CODE 636 W HCPCS: Mod: JG

## 2022-11-07 PROCEDURE — 99999 PR PBB SHADOW E&M-EST. PATIENT-LVL IV: CPT | Mod: PBBFAC,,,

## 2022-11-07 PROCEDURE — 96372 THER/PROPH/DIAG INJ SC/IM: CPT

## 2022-11-07 RX ORDER — COLESEVELAM 180 1/1
1875 TABLET ORAL 3 TIMES DAILY
COMMUNITY
End: 2024-03-28

## 2022-11-07 RX ORDER — PANTOPRAZOLE SODIUM 40 MG/1
40 TABLET, DELAYED RELEASE ORAL
COMMUNITY
End: 2023-05-15

## 2022-11-07 RX ORDER — VIT C/E/ZN/COPPR/LUTEIN/ZEAXAN 250MG-90MG
1000 CAPSULE ORAL DAILY
COMMUNITY

## 2022-11-07 RX ADMIN — DENOSUMAB 60 MG: 60 INJECTION SUBCUTANEOUS at 02:11

## 2022-11-07 NOTE — PROGRESS NOTES
[facility] Hematology/Oncology  PROGRESS NOTE      Subjective:         NAME: Sarah Hagen : 1965     57 y.o. female   MRN: [id number]    Referring Doc: No ref. provider found  Other Physicians:    Chief Complaint:    Chief Complaint   Patient presents with    Breast Cancer     Pt reports no new issue or concerns.         History of Present Illness:     1. Right upper outer breast cancer, T1N0M0, ER/ID positive. Her2 negative. Oncotype RS = 25   R breast biopsy 17: Invasive carcinoma, grade 1. R axillary LN biopsy negative for metastatic carcinoma.   ER 97%, ID 82% and Her2 negative   R breast lumpectomy and sentinel lymph node biopsy 17   Path: Invasive ductal carcinoma, well differentiated, measuring 1.1 cm. No LVI identified. DCIS identified. Margins negative. 0/3 lymph nodes with metastatic carcinoma.   Adjuvant TC x 4 cycles 16 - 10/24/17   Adjuvant radiation 11/15/17 - 18-- 45 Gy Dr. Kc   2. Osteopenia. On Prolia q 6 months 3/2018 -->   3. Acoustic neuroma   S/p surgery in [place] in 2018   Current Treatment:   Adjuvant Letrozole 2018       Interval History:  Ms. Hagen presents to clinic today for a 6-month follow-up visit for right breast cancer. Started adjuvant letrozole 2018.   Continues to take letrozole 1 tablet daily. Reports compliance. Takes medication with food states if she does not she will get dizzy. Reports history of acoustic neuroma. History of surgery where her hearing and balance nerves were removed. She does not perform self breast exams. History of osteopenia. Prolia every 6 months. Takes daily vitamin D. Does not take calcium (causes stomach upset-burning). Denies jaw pain, tooth extractions or fractures.   Denies any joint pain, mood changes, vaginal dryness or muscle/joint pain with use of letrozole. Patient is requesting surgical consult to have double mastectomy. She requested b/l mastectomy surgery from previous surgeon and he  "decline to perform surgery.     ROS:   GEN: normal without any fever, night sweats or weight loss  HEENT: normal with no HA's, sore throat, stiff neck, changes in vision  CV: normal with no CP, SOB, PND, NOWAK or orthopnea  PULM: normal with no SOB, cough, hemoptysis, sputum or pleuritic pain  GI: normal with no abdominal pain, nausea, vomiting, constipation, diarrhea, melanotic stools, BRBPR, or hematemesis  : normal with no hematuria, dysuria  BREAST: normal with no mass, discharge, pain  SKIN: normal with no rash, erythema, bruising, or swelling    Allergies:  Review of patient's allergies indicates:   Allergen Reactions    Sulfa (sulfonamide antibiotics)        Medications:    Current Outpatient Medications:     cholestyramine (QUESTRAN) 4 gram packet, Take 1 packet by mouth 2 (two) times a day., Disp: , Rfl:     letrozole (FEMARA) 2.5 mg Tab, TAKE ONE TABLET BY MOUTH EVERY DAY, Disp: 30 tablet, Rfl: 11    meloxicam (MOBIC) 7.5 MG tablet, Take 7.5 mg by mouth once daily., Disp: , Rfl:     omega-3 acid ethyl esters (LOVAZA) 1 gram capsule, Take 2 g by mouth once daily., Disp: , Rfl:     PMHx/PSHx Updates:  See patient's last visit with me on Visit date not found.  See H&P on         Pathology:   Cancer Staging   No matching staging information was found for the patient.          Objective:     Vitals:  Blood pressure (!) 150/71, pulse 90, temperature 97.7 °F (36.5 °C), resp. rate 18, height 5' 2" (1.575 m), weight 39.9 kg (88 lb), SpO2 98 %.    Physical Examination:   GEN: no apparent distress, comfortable; AAOx3  HEAD: atraumatic and normocephalic  EYES: no pallor, no icterus, PERRLA  ENT: OMM, no pharyngeal erythema, external ears WNL; no nasal discharge; no thrush  NECK: no masses, thyroid normal, trachea midline, no LAD/LN's, supple  CV: RRR with no murmur; normal pulse; normal S1 and S2; no pedal edema  CHEST: Normal respiratory effort; CTAB; normal breath sounds; no wheeze or crackles  ABDOM: nontender and " nondistended; soft; normal bowel sounds; no rebound/guarding  MUSC/Skeletal: ROM normal; no crepitus; joints normal; no deformities or arthropathy  EXTREM: no clubbing, cyanosis, inflammation or swelling  SKIN: no rashes, lesions, ulcers, petechiae or subcutaneous nodules  : no vela  NEURO: grossly intact; motor/sensory WNL; AAOx3; no tremors  PSYCH: normal mood, affect and behavior  LYMPH: normal cervical, supraclavicular, axillary and groin LN's      Labs:       Radiology/Diagnostic Studies:  6/16/22 MRI breast- 1) No MR evidence of malignancy in either breast. 2) Post-therapy changes of the right breast are benign. BI-RADS 2: benign.         I have reviewed all available lab results and radiology reports.    Assessment/Plan:     1) Right breast cancer, T1N0M0, s/p lumpectomy and SLNB. Tumor was ER and AK positive and Her2 negative but unfortunately, pt's Oncotype RS came back at 25 which put her in the intermediate risk category. Because of this, adjuvant chemotherapy with Taxotere and Cytoxan x 4 cycles was recommended; she completed chemo on 10/24/17 and subsequently completed adjuvant radiation in early 1/2018. She started Femara in early February 2018 and continues to tolerate it well.  June 16,2021 left diagnostic [facility] mammogram and limited US was negative for malignancy.    Additionally consideration of a yearly screening breast MRI (ideally 6 months following bilateral mammography). Recommendation for patient with a personal history of breast cancer and dense breast parenchyma. Ordered today.    2) Unintentional weight loss. CT CAP all unremarkable for malignancy. PapSmer negative. It has been recommended several times that she follow up with Dr. Plaza. She has not done this. States is not happy with Dr. Plaza. States he is doing nothing for me. She is currently working with PCP to establish w/ a new gastroenterologist. Is getting blood work at PCPs office on Monday. She will see PCP in the interim  for any concerns/problems.     3) Osteopenia. On Prolia q 6 months since 3/2018. Discussed getting calcium through her diet, continue Vitamin D daily. Benefits of weight bearing exercise reviewed. DEXA due in 12/18/2023.   Prolia today 11/7/22    4) Hypercalcemia - resolved.       PLAN:  RTC 6 months w/ NP  CMP prior to prolia  Referral to surgical oncology sent today  DEXA due in 12/18/2023.   Discussion:     I have explained all of the above in detail and the patient understands all of the current recommendation(s). I have answered all of their questions to the best of my ability and to their complete satisfaction.   The patient is to continue with the current management plan.            Electronically signed by Jerrica Cristina NP

## 2022-11-09 ENCOUNTER — TELEPHONE (OUTPATIENT)
Dept: HEMATOLOGY/ONCOLOGY | Facility: CLINIC | Age: 57
End: 2022-11-09
Payer: MEDICARE

## 2022-11-09 NOTE — TELEPHONE ENCOUNTER
----- Message from Nancy Alvarado sent at 11/9/2022  2:05 PM CST -----  Regarding: Mammo  Mammo scheduled 11/18/22 @ [facility] w/ 8:30 arrival    Appt confirmed w/ patient  ----- Message -----  From: Jerrica Cristina NP  Sent: 11/7/2022   2:51 PM CST  To: Nancy Alvarado    MMG ordered to be done next 2 weeks if possible. thanks

## 2022-11-11 DIAGNOSIS — C50.919 MALIGNANT NEOPLASM OF BREAST IN FEMALE, ESTROGEN RECEPTOR POSITIVE, UNSPECIFIED LATERALITY, UNSPECIFIED SITE OF BREAST: Primary | ICD-10-CM

## 2022-11-11 DIAGNOSIS — Z17.0 MALIGNANT NEOPLASM OF BREAST IN FEMALE, ESTROGEN RECEPTOR POSITIVE, UNSPECIFIED LATERALITY, UNSPECIFIED SITE OF BREAST: Primary | ICD-10-CM

## 2022-11-17 ENCOUNTER — DOCUMENTATION ONLY (OUTPATIENT)
Dept: HEMATOLOGY/ONCOLOGY | Facility: CLINIC | Age: 57
End: 2022-11-17
Payer: MEDICARE

## 2022-11-17 NOTE — PROGRESS NOTES
Dr. Maddox refused wanted patient to see her doctor that did the surgery. I referred patient to Dr. Suly Nelson. Patient has an appointment schedule for Nov 22nd at 0900. Jerrica Cristina NP notified.

## 2022-11-18 ENCOUNTER — HOSPITAL ENCOUNTER (OUTPATIENT)
Dept: RADIOLOGY | Facility: HOSPITAL | Age: 57
Discharge: HOME | End: 2022-11-18
Payer: MEDICARE

## 2022-11-18 DIAGNOSIS — Z12.31 SCREENING MAMMOGRAM FOR HIGH-RISK PATIENT: ICD-10-CM

## 2022-11-18 PROCEDURE — 77063 BREAST TOMOSYNTHESIS BI: CPT | Mod: 26,,, | Performed by: STUDENT IN AN ORGANIZED HEALTH CARE EDUCATION/TRAINING PROGRAM

## 2022-11-18 PROCEDURE — 77067 SCR MAMMO BI INCL CAD: CPT | Mod: 26,,, | Performed by: STUDENT IN AN ORGANIZED HEALTH CARE EDUCATION/TRAINING PROGRAM

## 2022-11-18 PROCEDURE — 77063 BREAST TOMOSYNTHESIS BI: CPT | Mod: TC

## 2022-11-18 PROCEDURE — 77067 MAMMO DIGITAL SCREENING BILAT WITH TOMO: ICD-10-PCS | Mod: 26,,, | Performed by: STUDENT IN AN ORGANIZED HEALTH CARE EDUCATION/TRAINING PROGRAM

## 2022-11-18 PROCEDURE — 77067 SCR MAMMO BI INCL CAD: CPT | Mod: TC

## 2022-11-18 PROCEDURE — 77063 MAMMO DIGITAL SCREENING BILAT WITH TOMO: ICD-10-PCS | Mod: 26,,, | Performed by: STUDENT IN AN ORGANIZED HEALTH CARE EDUCATION/TRAINING PROGRAM

## 2022-11-21 NOTE — PROGRESS NOTES
[facility] - Breast [place] [facility]  Breast Surgical Oncology  New Patient Office Visit - H&P      Referring Provider: Jerrica Cristina   PCP: Kimi Falcon NP     Chief Complaint:   Chief Complaint   Patient presents with    Breast Cancer     Patient presents today for Breast Ca Dx. Patient complains of stinging pain every now and again. Patient rates the pain when it comes a 3.        Subjective:     HPI:  Sarah Hagen is a 57 y.o. female who presents on 2022 for evaluation of  prophylactic mastectomy .  Patient diagnosed with right invasive carcinoma in 2017.  She is SP Right Lumpectomy and Right [place] Lymph Node Biopsy on 2017.  She is SP adjuvant chemotherapy with TC x 4 cycles and adjuvant radiation.  She is currently taking Letrozole.      A detailed patient history was obtained and reviewed. She currently denies any breast issues including rashes, redness, pain, swelling, nipple discharge, or new lumps/masses.    MG breast density: Category C (heterogeneously dense)     Imagin. 2022 BL BREAST MRI at [facility]- which revealed no evidence of malignancy in either breast. Post-therapy changes of the right breast are benign.  BIRADS-2 Benign    2. 2022 BL SC MG at [facility]- pending    Pathology:  . 2022 Right Lumpectomy with Right [place] Lymph Node Biopsy - Invasive ductal carcinoma (measuring 1.1 cm).  Ductal carcinoma in situ identified.  Margins negative,  Three lymph nodes negative for metastatic carcinoma.      OB/GYN History:  Age at Menarche Onset: 9  Menopausal Status: postmenopausal, LMP: No LMP recorded. Patient is postmenopausal.  Hysterectomy/Oophorectomy: menopause, at age 31  Hormonal birth control (duration): No none.   Pregnancy History:   Age at first live birth: 1  Hormone Replacement Therapy: Yes, Progesterone: no longer taking  Patient did not breast feed.  Patient denies nipple discharge.   Patient admits to to previous breast  biopsy- Stereotactic guided core needle biopsy of left breast in 12/16/2022 - Benign  Patient admits to to a personal history of breast cancer.    Other Relevant History:  Prior thoracic RT: none  Genetic testing: No  Ashkenazi Orthodox descent: No    Family History:  Family History   Problem Relation Age of Onset    Hypertension Mother         Age unkown    Osteoarthritis Mother     Heart failure Mother     Lung cancer Father 56    No Known Problems Sister     No Known Problems Brother     No Known Problems Maternal Grandmother     No Known Problems Maternal Grandfather     No Known Problems Paternal Grandmother     No Known Problems Paternal Grandfather         Past History:  Past Medical History:   Diagnosis Date    Acoustic neuroma     Infertility, female     Menopause 6/2006    At 35        Past Surgical History:   Procedure Laterality Date    APPENDECTOMY      BREAST BIOPSY      BREAST SURGERY  6/2017    Lumpectomy    broken nose N/A     COLONOSCOPY  10/05/2020    EXCISION OF ACOUSTIC NEUROMA N/A     OOPHORECTOMY  At 17    ovary cytologic material N/A         Social History     Socioeconomic History    Marital status:    Tobacco Use    Smoking status: Every Day     Packs/day: 1.00     Years: 15.00     Pack years: 15.00     Types: Cigarettes     Start date: 1/27/1982    Smokeless tobacco: Never    Tobacco comments:     Patient declined   Substance and Sexual Activity    Alcohol use: Yes     Alcohol/week: 2.0 standard drinks     Types: 2 Cans of beer per week     Comment: Weekend    Drug use: Never    Sexual activity: Yes     Partners: Male     Birth control/protection: None        Body mass index is 16.28 kg/m².     Allergy/Medications:   Review of patient's allergies indicates:   Allergen Reactions    Sulfa (sulfonamide antibiotics)           Current Outpatient Medications:     cholecalciferol, vitamin D3, (VITAMIN D3) 25 mcg (1,000 unit) capsule, Take 1,000 Units by mouth once daily., Disp: , Rfl:      "cholestyramine (QUESTRAN) 4 gram packet, Take 1 packet by mouth 2 (two) times a day., Disp: , Rfl:     colesevelam (WELCHOL) 625 mg tablet, Take 1,875 mg by mouth 3 (three) times daily., Disp: , Rfl:     diclofenac sodium (VOLTAREN) 1 % Gel, APPLY 2 GRAMS TO THE AFFECTED AREA(S) BY TOPICAL ROUTE 4 TIMES PER DAY, Disp: , Rfl:     letrozole (FEMARA) 2.5 mg Tab, TAKE ONE TABLET BY MOUTH EVERY DAY, Disp: 30 tablet, Rfl: 11    meloxicam (MOBIC) 7.5 MG tablet, Take 7.5 mg by mouth once daily., Disp: , Rfl:     ondansetron (ZOFRAN) 8 MG tablet, Take 8 mg by mouth every 8 (eight) hours as needed., Disp: , Rfl:     gabapentin (NEURONTIN) 100 MG capsule, Take 1 capsule (100 mg total) by mouth every evening., Disp: 30 capsule, Rfl: 0    pantoprazole (PROTONIX) 40 MG tablet, Take 40 mg by mouth as needed., Disp: , Rfl:        Review of Systems:  Review of Systems   Constitutional:  Negative for chills, fever and weight loss.   HENT:  Negative for sore throat.    Eyes:  Negative for blurred vision and double vision.   Respiratory:  Negative for cough and shortness of breath.    Cardiovascular:  Negative for chest pain, palpitations and leg swelling.   Gastrointestinal:  Positive for diarrhea. Negative for abdominal pain, constipation, heartburn, nausea and vomiting.   Genitourinary:  Negative for dysuria, flank pain, frequency, hematuria and urgency.   Musculoskeletal:  Negative for back pain, joint pain and myalgias.   Neurological:  Positive for tingling and headaches. Negative for dizziness.        Tingling in fingers   Endo/Heme/Allergies:  Does not bruise/bleed easily.   Psychiatric/Behavioral:  Negative for depression. The patient is not nervous/anxious.           Objective:     Vitals:  Blood pressure (!) 162/83, pulse 86, temperature 98.3 °F (36.8 °C), temperature source Oral, resp. rate 18, height 5' 2" (1.575 m), weight 40.4 kg (89 lb), SpO2 99 %.      Physical Exam:  General: The patient is awake, alert and oriented " times three. The patient is well nourished and in no acute distress.   Neck: There is no evidence of palpable cervical, supraclavicular or axillary adenopathy. The neck is supple. The thyroid is not enlarged.   Musculoskeletal: The patient has a normal range of motion of her bilateral upper extremities.   Chest: Examination of the chest wall fails to reveal any obvious abnormalities. The lungs are clear to auscultation bilaterally without rales, rhonchi, or wheezing.   Cardiovascular: The heart has a regular rate and rhythm without murmurs, gallops or rubs.  Breast:  Right: Examination of right breast fails to reveal any dominant masses or areas of significant focal nodularity. The nipple is everted without evidence of discharge. There is no skin dimpling with movement of the pectoralis. She has a well healed right breast incision from previous lumpectomy and right axillary incision from previous axillary surgery.  Left: Examination of the left breast fails to reveal any dominant masses or areas of significant focal nodularity. The nipple is everted without evidence of discharge. There is no skin dimpling with movement of the pectoralis. There are no significant skin changes overlying the breast.  Abdomen: The abdomen is soft, flat, nontender and nondistended with no palpable masses or organomegaly.  Integumentary: no rashes or skin lesions present  Neurologic: cranial nerves intact, no signs of peripheral neurological deficit, motor/sensory function intact    Assessment and Discussion:      Cancer Staging   No matching staging information was found for the patient.      Encounter Diagnoses   Name Primary?    Personal history of breast cancer Yes    Malignant neoplasm of breast in female, estrogen receptor positive, unspecified laterality, unspecified site of breast     Post menopausal syndrome          We discussed the role of prophylactic mastectomy in her care. Currently she does not have risk factors for needing  additional surgery. She does not have a family history and she currently does not have any breast issues. She did have a left breast biopsy last year which was benign.    We discussed screening schedule as well as the need for bilateral screening MRI. At this time, I don't believe she needs annual breast MRI. She is concerned about all of there radiation. She could consider having screening MRI bi-annually instead.     I recommended continuing annual screening mammogram with [facility].     I personally reviewed her recent SC MG which appears unchanged from her prior. Awaiting final read by breast radiologist.     Plan:       1. Recommend continued annual screening mammogram - next due Nov 2023  2. Post-menopausal symptoms while on Letrozole - we will do a trial of Gabapentin 100 mg QHS. She currently has hot flashes and sleep disturbance. Follow-up is planned to determine Gabapentin is working.  3. Recommend continuing Letrozole for at least the full 5 years. She is being followed by Medical Oncology  4. Clinical follow-up recommended after SC MG in 1 year          All of questions were answered    Suly Nelson MD  Breast Surgical Oncology     OFFICE VISIT CODING:    Non-face-to-face time included:  Yes Preparing to see the patient such as reviewing the patient record  Yes Obtaining and reviewing separately obtained history  Yes Independently interpreting results  Yes Documenting clinical information in electronic health record  No Ordering appropriate medications  Yes Ordering appropriate tests  Yes Ordering appropriate procedures (including follow-up)  Yes Referring and communicating with other health care professionals (not separately reported)  Yes Care Coordination (not separately reported)    Face-to-face time included:  Yes Performing a medically necessary appropriate history, examination, and/or evaluation  Yes Communicating results to the patient/family/caregiver  Yes Counseling and educating the  patient/family/caregiver  Yes Answering patient/family/caregiver questions    Total Time: 60 minutes    Total time includes both face-to-face and non-face-to-face time personally spent by myself on the day of the visit.

## 2022-11-22 ENCOUNTER — OFFICE VISIT (OUTPATIENT)
Dept: SURGERY | Facility: CLINIC | Age: 57
End: 2022-11-22
Payer: MEDICARE

## 2022-11-22 VITALS
HEART RATE: 86 BPM | WEIGHT: 89 LBS | OXYGEN SATURATION: 99 % | RESPIRATION RATE: 18 BRPM | HEIGHT: 62 IN | BODY MASS INDEX: 16.38 KG/M2 | DIASTOLIC BLOOD PRESSURE: 83 MMHG | TEMPERATURE: 98 F | SYSTOLIC BLOOD PRESSURE: 162 MMHG

## 2022-11-22 DIAGNOSIS — C50.919 MALIGNANT NEOPLASM OF BREAST IN FEMALE, ESTROGEN RECEPTOR POSITIVE, UNSPECIFIED LATERALITY, UNSPECIFIED SITE OF BREAST: ICD-10-CM

## 2022-11-22 DIAGNOSIS — N95.1 POST MENOPAUSAL SYNDROME: ICD-10-CM

## 2022-11-22 DIAGNOSIS — Z17.0 MALIGNANT NEOPLASM OF BREAST IN FEMALE, ESTROGEN RECEPTOR POSITIVE, UNSPECIFIED LATERALITY, UNSPECIFIED SITE OF BREAST: ICD-10-CM

## 2022-11-22 DIAGNOSIS — Z12.31 ENCOUNTER FOR SCREENING MAMMOGRAM FOR BREAST CANCER: ICD-10-CM

## 2022-11-22 DIAGNOSIS — Z85.3 PERSONAL HISTORY OF BREAST CANCER: Primary | ICD-10-CM

## 2022-11-22 PROCEDURE — 99215 OFFICE O/P EST HI 40 MIN: CPT | Mod: PBBFAC | Performed by: SURGERY

## 2022-11-22 PROCEDURE — 99999 PR PBB SHADOW E&M-EST. PATIENT-LVL V: ICD-10-PCS | Mod: PBBFAC,,, | Performed by: SURGERY

## 2022-11-22 PROCEDURE — 99205 PR OFFICE/OUTPT VISIT, NEW, LEVL V, 60-74 MIN: ICD-10-PCS | Mod: S$PBB,,, | Performed by: SURGERY

## 2022-11-22 PROCEDURE — 99999 PR PBB SHADOW E&M-EST. PATIENT-LVL V: CPT | Mod: PBBFAC,,, | Performed by: SURGERY

## 2022-11-22 PROCEDURE — 99205 OFFICE O/P NEW HI 60 MIN: CPT | Mod: S$PBB,,, | Performed by: SURGERY

## 2022-11-22 RX ORDER — DICLOFENAC SODIUM 10 MG/G
GEL TOPICAL
COMMUNITY
Start: 2022-09-01 | End: 2023-05-15

## 2022-11-22 RX ORDER — GABAPENTIN 100 MG/1
100 CAPSULE ORAL NIGHTLY
Qty: 30 CAPSULE | Refills: 0 | Status: SHIPPED | OUTPATIENT
Start: 2022-11-22 | End: 2022-12-30 | Stop reason: SDUPTHER

## 2022-11-22 RX ORDER — ONDANSETRON HYDROCHLORIDE 8 MG/1
8 TABLET, FILM COATED ORAL EVERY 8 HOURS PRN
COMMUNITY
End: 2023-05-15

## 2022-12-29 ENCOUNTER — TELEPHONE (OUTPATIENT)
Dept: SURGERY | Facility: CLINIC | Age: 57
End: 2022-12-29
Payer: MEDICARE

## 2022-12-30 DIAGNOSIS — Z85.3 PERSONAL HISTORY OF BREAST CANCER: ICD-10-CM

## 2022-12-30 DIAGNOSIS — N95.1 POST MENOPAUSAL SYNDROME: ICD-10-CM

## 2022-12-30 RX ORDER — GABAPENTIN 100 MG/1
100 CAPSULE ORAL NIGHTLY
Qty: 30 CAPSULE | Refills: 0 | Status: SHIPPED | OUTPATIENT
Start: 2022-12-30 | End: 2023-03-07 | Stop reason: SDUPTHER

## 2023-02-16 NOTE — TELEPHONE ENCOUNTER
Patient's pharmacy faxed a refill request for the following medication:    Gabapentin 100mg capsule  Take one capsule by mouth every evening  Quantity: 30    [facility]  Phone:[phone]  Fax: [phone]

## 2023-03-07 ENCOUNTER — TELEPHONE (OUTPATIENT)
Dept: SURGERY | Facility: CLINIC | Age: 58
End: 2023-03-07
Payer: MEDICARE

## 2023-03-07 DIAGNOSIS — N95.1 POST MENOPAUSAL SYNDROME: ICD-10-CM

## 2023-03-07 DIAGNOSIS — Z85.3 PERSONAL HISTORY OF BREAST CANCER: ICD-10-CM

## 2023-03-07 RX ORDER — GABAPENTIN 100 MG/1
100 CAPSULE ORAL NIGHTLY
Qty: 90 CAPSULE | Refills: 3 | Status: SHIPPED | OUTPATIENT
Start: 2023-03-07 | End: 2024-03-28

## 2023-03-07 NOTE — TELEPHONE ENCOUNTER
----- Message from Suly Nelson MD sent at 3/6/2023  3:59 PM CST -----  Regarding: RE: Pt Refill Prescription  Go ahead and send it    ----- Message -----  From: Fran Mcmanus RN  Sent: 3/6/2023   2:56 PM CST  To: Suly Nelson MD  Subject: RE: Pt Refill Prescription                       Are you okay with me reordering this for the patient?  ----- Message -----  From: Kaya Ann  Sent: 3/6/2023  12:59 PM CST  To: Fran Mcmanus RN  Subject: Pt Refill Prescription                           Patient called and requested a refill for her trial medication of Gabapentin 100 mg. [facility] is [facility] p. [phone] f. [phone].     Thank you!

## 2023-05-12 DIAGNOSIS — C50.919 MALIGNANT NEOPLASM OF BREAST IN FEMALE, ESTROGEN RECEPTOR POSITIVE, UNSPECIFIED LATERALITY, UNSPECIFIED SITE OF BREAST: Primary | ICD-10-CM

## 2023-05-12 DIAGNOSIS — Z17.0 MALIGNANT NEOPLASM OF BREAST IN FEMALE, ESTROGEN RECEPTOR POSITIVE, UNSPECIFIED LATERALITY, UNSPECIFIED SITE OF BREAST: Primary | ICD-10-CM

## 2023-05-12 DIAGNOSIS — M85.80 OSTEOPENIA, UNSPECIFIED LOCATION: ICD-10-CM

## 2023-05-15 ENCOUNTER — OFFICE VISIT (OUTPATIENT)
Dept: HEMATOLOGY/ONCOLOGY | Facility: CLINIC | Age: 58
End: 2023-05-15
Payer: MEDICARE

## 2023-05-15 ENCOUNTER — INFUSION (OUTPATIENT)
Dept: INFUSION THERAPY | Facility: HOSPITAL | Age: 58
End: 2023-05-15
Payer: MEDICARE

## 2023-05-15 VITALS
DIASTOLIC BLOOD PRESSURE: 65 MMHG | HEART RATE: 89 BPM | OXYGEN SATURATION: 99 % | BODY MASS INDEX: 16.45 KG/M2 | SYSTOLIC BLOOD PRESSURE: 140 MMHG | HEIGHT: 62 IN | WEIGHT: 89.38 LBS | TEMPERATURE: 98 F | RESPIRATION RATE: 20 BRPM

## 2023-05-15 VITALS
BODY MASS INDEX: 16.45 KG/M2 | DIASTOLIC BLOOD PRESSURE: 65 MMHG | WEIGHT: 89.38 LBS | HEIGHT: 62 IN | OXYGEN SATURATION: 99 % | TEMPERATURE: 98 F | HEART RATE: 89 BPM | RESPIRATION RATE: 20 BRPM | SYSTOLIC BLOOD PRESSURE: 140 MMHG

## 2023-05-15 DIAGNOSIS — C50.811 MALIGNANT NEOPLASM OF OVERLAPPING SITES OF RIGHT BREAST IN FEMALE, ESTROGEN RECEPTOR POSITIVE: ICD-10-CM

## 2023-05-15 DIAGNOSIS — Z17.0 MALIGNANT NEOPLASM OF BREAST IN FEMALE, ESTROGEN RECEPTOR POSITIVE, UNSPECIFIED LATERALITY, UNSPECIFIED SITE OF BREAST: Primary | ICD-10-CM

## 2023-05-15 DIAGNOSIS — Z17.0 MALIGNANT NEOPLASM OF OVERLAPPING SITES OF RIGHT BREAST IN FEMALE, ESTROGEN RECEPTOR POSITIVE: ICD-10-CM

## 2023-05-15 DIAGNOSIS — M85.80 OSTEOPENIA, UNSPECIFIED LOCATION: Primary | ICD-10-CM

## 2023-05-15 DIAGNOSIS — Z12.31 SCREENING MAMMOGRAM FOR HIGH-RISK PATIENT: ICD-10-CM

## 2023-05-15 DIAGNOSIS — Z79.811 LONG TERM CURRENT USE OF AROMATASE INHIBITOR: ICD-10-CM

## 2023-05-15 DIAGNOSIS — C50.919 MALIGNANT NEOPLASM OF BREAST IN FEMALE, ESTROGEN RECEPTOR POSITIVE, UNSPECIFIED LATERALITY, UNSPECIFIED SITE OF BREAST: Primary | ICD-10-CM

## 2023-05-15 DIAGNOSIS — M85.80 OSTEOPENIA, UNSPECIFIED LOCATION: ICD-10-CM

## 2023-05-15 PROCEDURE — 96372 THER/PROPH/DIAG INJ SC/IM: CPT

## 2023-05-15 PROCEDURE — 63600175 PHARM REV CODE 636 W HCPCS: Mod: JZ,JG

## 2023-05-15 PROCEDURE — 99214 PR OFFICE/OUTPT VISIT, EST, LEVL IV, 30-39 MIN: ICD-10-PCS | Mod: S$PBB,,,

## 2023-05-15 PROCEDURE — 99999 PR PBB SHADOW E&M-EST. PATIENT-LVL V: CPT | Mod: PBBFAC,,,

## 2023-05-15 PROCEDURE — 99999 PR PBB SHADOW E&M-EST. PATIENT-LVL V: ICD-10-PCS | Mod: PBBFAC,,,

## 2023-05-15 PROCEDURE — 99214 OFFICE O/P EST MOD 30 MIN: CPT | Mod: S$PBB,,,

## 2023-05-15 PROCEDURE — 99215 OFFICE O/P EST HI 40 MIN: CPT | Mod: PBBFAC

## 2023-05-15 RX ORDER — MONTELUKAST SODIUM 10 MG/1
10 TABLET ORAL NIGHTLY
COMMUNITY
Start: 2023-04-17

## 2023-05-15 RX ADMIN — DENOSUMAB 60 MG: 60 INJECTION SUBCUTANEOUS at 11:05

## 2023-05-15 NOTE — PROGRESS NOTES
[facility] Hematology/Oncology  PROGRESS NOTE      Subjective:         NAME: Sarah Hagen : 1965     58 y.o. female   MRN: [id number]    Referring Doc: No ref. provider found  Other Physicians:    Chief Complaint:    Chief Complaint   Patient presents with    Breast Cancer     F/u with labs-- Patient reports no new concerns          History of Present Illness:     1. Right upper outer breast cancer, T1N0M0, ER/MO positive. Her2 negative. Oncotype RS = 25   R breast biopsy 17: Invasive carcinoma, grade 1. R axillary LN biopsy negative for metastatic carcinoma.   ER 97%, MO 82% and Her2 negative   R breast lumpectomy and sentinel lymph node biopsy 17   Path: Invasive ductal carcinoma, well differentiated, measuring 1.1 cm. No LVI identified. DCIS identified. Margins negative. 0/3 lymph nodes with metastatic carcinoma.   Adjuvant TC x 4 cycles 16 - 10/24/17   Adjuvant radiation 11/15/17 - 18-- 45 Gy Dr. Kc   2. Osteopenia. On Prolia q 6 months 3/2018 -->   3. Acoustic neuroma   S/p surgery in [place] in 2018   Current Treatment:   Adjuvant Letrozole 2018       Interval History:  She returns to the clinic today for a six-month follow up regarding her history of right breast cancer.  She continues to take her letrozole 2.5 mg daily as prescribed.  She did have a visit with Dr. Nelson to evaluate for prophylactic bilateral mastectomies.  She did decide against this and received breast implants 2023 by Dr. Joy at [facility] in [place].  She reports pain to her right breast after her implant was placed.  She reports Dr. Joy said this was due to scar tissue and he would like to remove the scar tissue to help with the pain.  She has not decided to complete this at this time.  Next follow up with them is scheduled for 2023.  She continues to take her Prolia every 6 months and is due today.  She continues to take her gabapentin 100 mg at night for hot flashes.   Overall today she is doing well.  She does have bilateral joint pain.  She denies any mood changes, vaginal dryness, jaw pain, N/V/C/D, shortness of breath, chest pain, dizziness, recent hospitalizations or recent infections.    ROS:   Review of Systems   Constitutional:  Negative for appetite change, chills, fatigue, fever and unexpected weight change.   HENT:  Negative for facial swelling, mouth sores, nosebleeds, sinus pressure/congestion and sore throat.    Eyes:  Negative for photophobia and pain.   Respiratory:  Negative for cough, chest tightness, shortness of breath and wheezing.    Cardiovascular:  Negative for chest pain, palpitations and leg swelling.   Gastrointestinal:  Negative for abdominal pain, blood in stool, change in bowel habit, constipation, diarrhea, nausea, vomiting and change in bowel habit.   Genitourinary:  Positive for hot flashes. Negative for dysuria, frequency, hematuria, pelvic pain, urgency and vaginal pain.   Musculoskeletal:  Positive for arthralgias. Negative for gait problem, joint swelling and myalgias.   Integumentary:  Negative for pallor, rash, wound, breast mass, breast discharge and breast tenderness.        Breast pain to right breast from breast implant.  Bilateral breast augmentation.   Allergic/Immunologic: Negative.  Negative for immunocompromised state.   Neurological:  Negative for dizziness, vertigo, syncope, weakness and numbness.   Hematological:  Negative for adenopathy. Does not bruise/bleed easily.   Psychiatric/Behavioral:  Negative for behavioral problems and dysphoric mood. The patient is not nervous/anxious.    All other systems reviewed and are negative.  Breast: Negative for mass and tenderness       Allergies:  Review of patient's allergies indicates:   Allergen Reactions    Sulfa (sulfonamide antibiotics)        Medications:    Current Outpatient Medications:     cholecalciferol, vitamin D3, (VITAMIN D3) 25 mcg (1,000 unit) capsule, Take 1,000 Units by  "mouth once daily., Disp: , Rfl:     colesevelam (WELCHOL) 625 mg tablet, Take 1,875 mg by mouth 3 (three) times daily., Disp: , Rfl:     gabapentin (NEURONTIN) 100 MG capsule, Take 1 capsule (100 mg total) by mouth every evening., Disp: 90 capsule, Rfl: 3    letrozole (FEMARA) 2.5 mg Tab, TAKE ONE TABLET BY MOUTH EVERY DAY, Disp: 30 tablet, Rfl: 11    meloxicam (MOBIC) 7.5 MG tablet, Take 7.5 mg by mouth once daily., Disp: , Rfl:     montelukast (SINGULAIR) 10 mg tablet, Take 10 mg by mouth every evening., Disp: , Rfl:     Current Facility-Administered Medications:     denosumab (PROLIA) injection 60 mg, 60 mg, Subcutaneous, 1 time in Clinic/HOD, Anuja Garcia, FNP    PMHx/PSHx Updates:  Past Medical History:   Diagnosis Date    Acoustic neuroma     Infertility, female     Menopause 6/2006    At 35      Past Surgical History:   Procedure Laterality Date    APPENDECTOMY      BREAST BIOPSY      BREAST SURGERY  6/2017    Lumpectomy    broken nose N/A     COLONOSCOPY  10/05/2020    EXCISION OF ACOUSTIC NEUROMA N/A     OOPHORECTOMY  At 17    ovary cytologic material N/A             Pathology:   Cancer Staging   No matching staging information was found for the patient.          Objective:     Vitals:  Blood pressure (!) 140/65, pulse 89, temperature 98.3 °F (36.8 °C), resp. rate 20, height 5' 2.01" (1.575 m), weight 40.6 kg (89 lb 6.4 oz), SpO2 99 %.    Physical Examination:   Physical Exam  Vitals reviewed.   Constitutional:       General: She is not in acute distress.     Appearance: Normal appearance. She is normal weight.   HENT:      Head: Normocephalic and atraumatic.      Nose: Nose normal. No congestion or rhinorrhea.      Mouth/Throat:      Mouth: Mucous membranes are moist.      Pharynx: Oropharynx is clear. No oropharyngeal exudate or posterior oropharyngeal erythema.   Eyes:      Extraocular Movements: Extraocular movements intact.      Conjunctiva/sclera: Conjunctivae normal.      Pupils: Pupils are equal, " round, and reactive to light.   Cardiovascular:      Rate and Rhythm: Normal rate and regular rhythm.      Pulses: Normal pulses.      Heart sounds: Normal heart sounds. No murmur heard.    No friction rub. No gallop.   Pulmonary:      Effort: Pulmonary effort is normal. No respiratory distress.      Breath sounds: Normal breath sounds. No wheezing, rhonchi or rales.   Chest:      Comments: Well-healed lumpectomy incision noted to right breast.  Bilateral breast implants.  No adenopathy, masses, rashes, skin changes noted bilaterally.  Abdominal:      General: Abdomen is flat. Bowel sounds are normal.      Palpations: Abdomen is soft. There is no mass.      Tenderness: There is no abdominal tenderness. There is no guarding.   Musculoskeletal:         General: No swelling, tenderness or signs of injury. Normal range of motion.      Cervical back: Normal range of motion and neck supple.      Right lower leg: No edema.      Left lower leg: No edema.   Lymphadenopathy:      Cervical: No cervical adenopathy.      Comments: No adenopathy noted bilaterally    Skin:     General: Skin is warm and dry.      Findings: No erythema, lesion or rash.   Neurological:      General: No focal deficit present.      Mental Status: She is alert and oriented to person, place, and time. Mental status is at baseline.      Cranial Nerves: No cranial nerve deficit.      Motor: No weakness.      Gait: Gait normal.   Psychiatric:         Mood and Affect: Mood normal.         Behavior: Behavior normal.         Thought Content: Thought content normal.         Judgment: Judgment normal.          Labs:       Radiology/Diagnostic Studies:  6/16/22 MRI breast- 1) No MR evidence of malignancy in either breast. 2) Post-therapy changes of the right breast are benign. BI-RADS 2: benign.         I have reviewed all available lab results and radiology reports.    Assessment/Plan:     Right breast cancer, T1N0M0, s/p lumpectomy and SLNB.   Tumor was ER and  MO positive and Her2 negative but unfortunately, pt's Oncotype RS came back at 25 which put her in the intermediate risk category. Because of this, adjuvant chemotherapy with Taxotere and Cytoxan x 4 cycles was recommended; she completed chemo on 10/24/17 and subsequently completed adjuvant radiation in early 1/2018. She started Femara in early February 2018 and continues to tolerate it well.  She has completed over 5 years of AI therapy at this time.  I did have a discussion with her regarding the risks and benefits of continuing AI therapy for another 2-5 years.  She would like to discontinue AI therapy at this time due to hot flashes and joint pain.     Mammogram   Mammogram completed 11/25/2022 showed no evidence of malignancy.  Routine annual screening recommended in 1 year.  Bilateral screening mammogram ordered today to be completed 11/26/2023.    Bone density  Osteopenia  Currently on Prolia every 6 months since 03/2018.  She is currently due for Prolia at this time.  She would like to get off Prolia if possible.  I did discuss with her the possibility of stopping Prolia after completion of her next DEXA exam.  Continue vitamin-D.  She reports that she was unable to tolerate calcium supplement.  Imaging  Last DEXA completed 12/18/2021.  Currently due for DEXA 12/18/2023.  Order placed today.    Unintentional weight loss.    Previous CT CAP all unremarkable for malignancy. PapSmer negative.  Recommended follow up with Dr. Plaza.  It has been recommended several times that she follow up with Dr. Plaza.   Weight stable today with no weight loss in 6 months.  Continue to monitor at this time.      PLAN:  Labs stable.    DC letrozole as she has completed 5 years of therapy.    Prolia today as scheduled.  Consider DC at next appointment.  Follow up with breast surgery as scheduled.    Mammogram ordered to be completed 11/2023.    DEXA ordered to be completed 12/18/2023.    Return to clinic with NP after  completion of mammogram and DEXA for follow up/labs    Discussion:     I have explained all of the above in detail and the patient understands all of the current recommendation(s). I have answered all of their questions to the best of my ability and to their complete satisfaction.   The patient is to continue with the current management plan.            Electronically signed by KATERINE Chun

## 2023-08-25 ENCOUNTER — CLINICAL SUPPORT (OUTPATIENT)
Dept: RESPIRATORY THERAPY | Facility: HOSPITAL | Age: 58
End: 2023-08-25
Attending: OBSTETRICS & GYNECOLOGY
Payer: MEDICARE

## 2023-08-25 DIAGNOSIS — Z01.810 PRE-OPERATIVE CARDIOVASCULAR EXAMINATION: ICD-10-CM

## 2023-08-25 DIAGNOSIS — Z01.810 PRE-OPERATIVE CARDIOVASCULAR EXAMINATION: Primary | ICD-10-CM

## 2023-08-25 PROCEDURE — 93005 ELECTROCARDIOGRAM TRACING: CPT

## 2023-12-21 ENCOUNTER — HOSPITAL ENCOUNTER (OUTPATIENT)
Dept: RADIOLOGY | Facility: HOSPITAL | Age: 58
Discharge: HOME | End: 2023-12-21
Payer: MEDICARE

## 2023-12-21 DIAGNOSIS — Z79.811 LONG TERM CURRENT USE OF AROMATASE INHIBITOR: ICD-10-CM

## 2023-12-21 DIAGNOSIS — Z12.31 SCREENING MAMMOGRAM FOR HIGH-RISK PATIENT: ICD-10-CM

## 2023-12-21 DIAGNOSIS — M85.80 OSTEOPENIA, UNSPECIFIED LOCATION: ICD-10-CM

## 2023-12-21 PROCEDURE — 77080 DXA BONE DENSITY AXIAL: CPT | Mod: TC

## 2023-12-28 ENCOUNTER — OFFICE VISIT (OUTPATIENT)
Dept: HEMATOLOGY/ONCOLOGY | Facility: CLINIC | Age: 58
End: 2023-12-28
Payer: MEDICARE

## 2023-12-28 VITALS
HEIGHT: 62 IN | OXYGEN SATURATION: 99 % | HEART RATE: 87 BPM | BODY MASS INDEX: 16.45 KG/M2 | WEIGHT: 89.38 LBS | SYSTOLIC BLOOD PRESSURE: 149 MMHG | DIASTOLIC BLOOD PRESSURE: 82 MMHG | RESPIRATION RATE: 18 BRPM | TEMPERATURE: 98 F

## 2023-12-28 DIAGNOSIS — C50.919 MALIGNANT NEOPLASM OF BREAST IN FEMALE, ESTROGEN RECEPTOR POSITIVE, UNSPECIFIED LATERALITY, UNSPECIFIED SITE OF BREAST: Primary | ICD-10-CM

## 2023-12-28 DIAGNOSIS — Z17.0 MALIGNANT NEOPLASM OF BREAST IN FEMALE, ESTROGEN RECEPTOR POSITIVE, UNSPECIFIED LATERALITY, UNSPECIFIED SITE OF BREAST: Primary | ICD-10-CM

## 2023-12-28 DIAGNOSIS — Z12.31 SCREENING MAMMOGRAM FOR HIGH-RISK PATIENT: ICD-10-CM

## 2023-12-28 DIAGNOSIS — M85.80 OSTEOPENIA, UNSPECIFIED LOCATION: ICD-10-CM

## 2023-12-28 PROCEDURE — 99214 PR OFFICE/OUTPT VISIT, EST, LEVL IV, 30-39 MIN: ICD-10-PCS | Mod: S$PBB,,, | Performed by: INTERNAL MEDICINE

## 2023-12-28 PROCEDURE — 99999 PR PBB SHADOW E&M-EST. PATIENT-LVL III: ICD-10-PCS | Mod: PBBFAC,,, | Performed by: INTERNAL MEDICINE

## 2023-12-28 PROCEDURE — 99214 OFFICE O/P EST MOD 30 MIN: CPT | Mod: S$PBB,,, | Performed by: INTERNAL MEDICINE

## 2023-12-28 PROCEDURE — 99999 PR PBB SHADOW E&M-EST. PATIENT-LVL III: CPT | Mod: PBBFAC,,, | Performed by: INTERNAL MEDICINE

## 2023-12-28 PROCEDURE — 99213 OFFICE O/P EST LOW 20 MIN: CPT | Mod: PBBFAC | Performed by: INTERNAL MEDICINE

## 2023-12-28 RX ORDER — PANTOPRAZOLE SODIUM 40 MG/1
TABLET, DELAYED RELEASE ORAL
COMMUNITY
Start: 2023-12-14

## 2023-12-28 RX ORDER — HYDROCODONE BITARTRATE AND ACETAMINOPHEN 7.5; 325 MG/1; MG/1
TABLET ORAL
COMMUNITY
Start: 2023-08-18

## 2023-12-28 NOTE — PROGRESS NOTES
[facility] Hematology/Oncology  PROGRESS NOTE      Subjective:         NAME: Sarah Hagen : 1965     58 y.o. female   MRN: [id number]    Referring Doc: No ref. provider found  Other Physicians:    Chief Complaint:    Chief Complaint   Patient presents with    Breast Cancer     Pt c/o right breast leaking from scar/cut.         History of Present Illness:     1. Right upper outer breast cancer, T1N0M0, ER/AL positive. Her2 negative. Oncotype RS = 25   R breast biopsy 17: Invasive carcinoma, grade 1. R axillary LN biopsy negative for metastatic carcinoma.   ER 97%, AL 82% and Her2 negative   R breast lumpectomy and sentinel lymph node biopsy 17   Path: Invasive ductal carcinoma, well differentiated, measuring 1.1 cm. No LVI identified. DCIS identified. Margins negative. 0/3 lymph nodes with metastatic carcinoma.   Adjuvant TC x 4 cycles 16 - 10/24/17   Adjuvant radiation 11/15/17 - 18-- 45 Gy Dr. Kc   2. Osteopenia. On Prolia q 6 months 3/2018 -->   3. Acoustic neuroma   S/p surgery in [place] in 2018   Current Treatment:   Adjuvant Letrozole 2018       Interval History:.  23 breast implant is leaking. She is on antibiotics. No symptoms to suggest recurrent disease. WBC is 11.6 and Ca is 10.7; she intermittently has a mildly elevated calcium then it will be normal in between.   5/15/23 She returns to the clinic today for a six-month follow up regarding her history of right breast cancer.  She continues to take her letrozole 2.5 mg daily as prescribed.  She did have a visit with Dr. Nelson to evaluate for prophylactic bilateral mastectomies.  She did decide against this and received breast implants 2023 by Dr. Joy at [facility] in [place].  She reports pain to her right breast after her implant was placed.  She reports Dr. Joy said this was due to scar tissue and he would like to remove the scar tissue to help with the pain.  She has not decided to  complete this at this time.  Next follow up with them is scheduled for 08/2023.  She continues to take her Prolia every 6 months and is due today.  She continues to take her gabapentin 100 mg at night for hot flashes.  Overall today she is doing well.  She does have bilateral joint pain.  She denies any mood changes, vaginal dryness, jaw pain, N/V/C/D, shortness of breath, chest pain, dizziness, recent hospitalizations or recent infections.    ROS:   Review of Systems   Constitutional:  Negative for appetite change, chills, fatigue, fever and unexpected weight change.   HENT:  Negative for facial swelling, mouth sores, nosebleeds, sinus pressure/congestion and sore throat.    Eyes:  Negative for photophobia and pain.   Respiratory:  Negative for cough, chest tightness, shortness of breath and wheezing.    Cardiovascular:  Negative for chest pain, palpitations and leg swelling.   Gastrointestinal:  Negative for abdominal pain, blood in stool, change in bowel habit, constipation, diarrhea, nausea and vomiting.   Genitourinary:  Positive for hot flashes. Negative for dysuria, frequency, hematuria, pelvic pain, urgency and vaginal pain.   Musculoskeletal:  Positive for arthralgias. Negative for gait problem, joint swelling and myalgias.   Integumentary:  Negative for pallor, rash, wound, breast mass, breast discharge and breast tenderness.        Breast pain to right breast from breast implant.  Bilateral breast augmentation.   Allergic/Immunologic: Negative.  Negative for immunocompromised state.   Neurological:  Negative for dizziness, vertigo, syncope, weakness and numbness.   Hematological:  Negative for adenopathy. Does not bruise/bleed easily.   Psychiatric/Behavioral:  Negative for behavioral problems and dysphoric mood. The patient is not nervous/anxious.    All other systems reviewed and are negative.  Breast: Negative for mass and tenderness         Allergies:  Review of patient's allergies indicates:  "  Allergen Reactions    Sulfa (sulfonamide antibiotics)        Medications:    Current Outpatient Medications:     cholecalciferol, vitamin D3, (VITAMIN D3) 25 mcg (1,000 unit) capsule, Take 1,000 Units by mouth once daily., Disp: , Rfl:     colesevelam (WELCHOL) 625 mg tablet, Take 1,875 mg by mouth 3 (three) times daily., Disp: , Rfl:     gabapentin (NEURONTIN) 100 MG capsule, Take 1 capsule (100 mg total) by mouth every evening., Disp: 90 capsule, Rfl: 3    HYDROcodone-acetaminophen (NORCO) 7.5-325 mg per tablet, TAKE 1 TO 2 TABLETS BY MOUTH EVERY 4 TO 6 HOURS AS NEEDED FOR 7 DAYS, Disp: , Rfl:     meloxicam (MOBIC) 7.5 MG tablet, Take 7.5 mg by mouth once daily., Disp: , Rfl:     montelukast (SINGULAIR) 10 mg tablet, Take 10 mg by mouth every evening., Disp: , Rfl:     pantoprazole (PROTONIX) 40 MG tablet, TAKE ONE TABLET BY MOUTH EVERY DAY *NEEDS APPT FOR FURTHER REFILLS*, Disp: , Rfl:     letrozole (FEMARA) 2.5 mg Tab, TAKE ONE TABLET BY MOUTH EVERY DAY (Patient not taking: Reported on 12/28/2023.), Disp: 30 tablet, Rfl: 11    PMHx/PSHx Updates:  Past Medical History:   Diagnosis Date    Acoustic neuroma     Infertility, female     Menopause 6/2006    At 35      Past Surgical History:   Procedure Laterality Date    APPENDECTOMY      BREAST BIOPSY      BREAST SURGERY  6/2017    Lumpectomy    broken nose N/A     COLONOSCOPY  10/05/2020    EXCISION OF ACOUSTIC NEUROMA N/A     OOPHORECTOMY  At 17    ovary cytologic material N/A             Pathology:   Cancer Staging   No matching staging information was found for the patient.          Objective:     Vitals:  Blood pressure (!) 149/82, pulse 87, temperature 98 °F (36.7 °C), resp. rate 18, height 5' 2.01" (1.575 m), weight 40.6 kg (89 lb 6.4 oz), SpO2 99 %.    Physical Examination:   Physical Exam  Vitals reviewed.   Constitutional:       General: She is not in acute distress.     Appearance: Normal appearance. She is normal weight.   HENT:      Head: Normocephalic " and atraumatic.      Nose: Nose normal. No congestion or rhinorrhea.      Mouth/Throat:      Mouth: Mucous membranes are moist.      Pharynx: Oropharynx is clear. No oropharyngeal exudate or posterior oropharyngeal erythema.   Eyes:      Extraocular Movements: Extraocular movements intact.      Conjunctiva/sclera: Conjunctivae normal.      Pupils: Pupils are equal, round, and reactive to light.   Cardiovascular:      Rate and Rhythm: Normal rate and regular rhythm.      Pulses: Normal pulses.      Heart sounds: Normal heart sounds. No murmur heard.     No friction rub. No gallop.   Pulmonary:      Effort: Pulmonary effort is normal. No respiratory distress.      Breath sounds: Normal breath sounds. No wheezing, rhonchi or rales.   Chest:      Comments: Well-healed lumpectomy incision noted to right breast.  Bilateral breast implants.  No adenopathy, masses, rashes, skin changes noted bilaterally.  Abdominal:      General: Abdomen is flat. Bowel sounds are normal.      Palpations: Abdomen is soft. There is no mass.      Tenderness: There is no abdominal tenderness. There is no guarding.   Musculoskeletal:         General: No swelling, tenderness or signs of injury. Normal range of motion.      Cervical back: Normal range of motion and neck supple.      Right lower leg: No edema.      Left lower leg: No edema.   Lymphadenopathy:      Cervical: No cervical adenopathy.      Comments: No adenopathy noted bilaterally    Skin:     General: Skin is warm and dry.      Findings: No erythema, lesion or rash.   Neurological:      General: No focal deficit present.      Mental Status: She is alert and oriented to person, place, and time. Mental status is at baseline.      Cranial Nerves: No cranial nerve deficit.      Motor: No weakness.      Gait: Gait normal.   Psychiatric:         Mood and Affect: Mood normal.         Behavior: Behavior normal.         Thought Content: Thought content normal.         Judgment: Judgment  normal.            Labs:       Radiology/Diagnostic Studies:  6/16/22 MRI breast- 1) No MR evidence of malignancy in either breast. 2) Post-therapy changes of the right breast are benign. BI-RADS 2: benign.     AP LUMBAR SPINE  BMD:  1.088       g/cm2    T-SCORE:  -0.9            Z-SCORE:  0.8      LEFT HIP  BMD:  0.79     g/cm2    T-SCORE:  -1.7               Z-SCORE:  -0.5   12/21/23 DEXA COMPARISON:  12/17/2021     FINDINGS:  AP LUMBAR SPINE     BMD: 1.029 g/cm2  T-SCORE: -1.2 Z-SCORE: 0.5     LEFT HIP     BMD: 0.810 g/cm2  T-SCORE: -1.6 Z-SCORE: -0.2     World Health Organization (WHO) criteria for post-menopausal,  women:     Normal:   T-score at or above -1 SD     Osteopenia:            T-score between -1 and -2.5 SD     Osteoporosis:  T-score at or below -2.5 SD     Impression:     1. Osteopenia of the lumbar spine  2. Osteopenia of the left hip      I have reviewed all available lab results and radiology reports.    Assessment/Plan:     Right breast cancer, T1N0M0, s/p lumpectomy and SLNB.   Tumor was ER and ME positive and Her2 negative but unfortunately, pt's Oncotype RS came back at 25 which put her in the intermediate risk category. Because of this, adjuvant chemotherapy with Taxotere and Cytoxan x 4 cycles was recommended; she completed chemo on 10/24/17 and subsequently completed adjuvant radiation in early 1/2018. She started Femara in early February 2018 and continues to tolerate it well.  She has completed over 5 years of AI therapy at this time.  I did have a discussion with her regarding the risks and benefits of continuing AI therapy for another 2-5 years.  She would like to discontinue AI therapy at this time due to hot flashes and joint pain.     Mammogram   Mammogram completed 11/25/2022 showed no evidence of malignancy.  Routine annual screening recommended in 1 year.  Bilateral screening mammogram ordered today to be completed 11/26/2023.    Bone density  Osteopenia  Currently on  Prolia every 6 months since 03/2018.  She is currently due for Prolia at this time.  She would like to get off Prolia if possible.  I did discuss with her the possibility of stopping Prolia after completion of her next DEXA exam.  Continue vitamin-D.  She reports that she was unable to tolerate calcium supplement.  Imaging  Last DEXA completed 12/18/2023.  Lumbar is marginallly worse and hip is marginally better.   Unintentional weight loss.    Previous CT CAP all unremarkable for malignancy. PapSmer negative.  Recommended follow up with Dr. Plaza.  It has been recommended several times that she follow up with Dr. Plaza.   Weight stable today with no weight loss in 6 months.  Continue to monitor at this time.      PLAN:  Currently has completed Adjuvant therapy  DEXA stable to improved.   Therefore, Prolia will be discontinued. Last dose May 2023  Follow up with breast surgery as scheduled.    Mammogram ordered to be completed 11/2023 was cancelled due to breast infection.      Sees Dr. Joy in April for implants and she suspects she will have a mmg then  RTC in one year.     Discussion:     I have explained all of the above in detail and the patient understands all of the current recommendation(s). I have answered all of their questions to the best of my ability and to their complete satisfaction.   The patient is to continue with the current management plan.            Electronically signed by Kizzy Berman MD

## 2024-03-28 ENCOUNTER — HOSPITAL ENCOUNTER (OUTPATIENT)
Dept: WOUND CARE | Facility: HOSPITAL | Age: 59
Discharge: HOME | End: 2024-03-28
Attending: FAMILY MEDICINE | Admitting: FAMILY MEDICINE
Payer: MEDICARE

## 2024-03-28 VITALS
DIASTOLIC BLOOD PRESSURE: 66 MMHG | RESPIRATION RATE: 18 BRPM | HEART RATE: 87 BPM | BODY MASS INDEX: 15.83 KG/M2 | HEIGHT: 62 IN | SYSTOLIC BLOOD PRESSURE: 155 MMHG | WEIGHT: 86 LBS

## 2024-03-28 DIAGNOSIS — S21.001A OPEN WOUND OF RIGHT BREAST WITH COMPLICATION, INITIAL ENCOUNTER: Primary | ICD-10-CM

## 2024-03-28 PROCEDURE — 99212 OFFICE O/P EST SF 10 MIN: CPT

## 2024-03-28 PROCEDURE — 27000999 HC MEDICAL RECORD PHOTO DOCUMENTATION

## 2024-03-28 NOTE — PROGRESS NOTES
NOTES:  Patient has a hx of breast cancer, 2017, lumpectomy and lymphnode removed. Patient underwent chemo and radiation following procedure. 01/2023 patient decided to get implants done with Dr. Joy at [facility]. Surgery went well but after a while the implant started to come out of right breast. Patient states doctor told her this was due to scar tissue. Patient had second surgery done 08/2023 to remove scar tissue and have another implant placed. 2 month follow up from surgery all was well. Patient ended up at ER in [place] at the end of 10/2023 due to drainage and pain, opening at incision site of right breast. Patient was started on IV abx and sent home on oral. Patient followed up with surgeon 11/2023, implant removed in office. Patient notes cauterization done in surgeon office every visit, healing since initial implant removal. Patient has since been covering and keeping a dry dressing over the wound.   Subjective:       Patient ID: Sarah Hagen is a 59 y.o. female.    Chief Complaint: Non-healing Wound (Right breast (augmentation/implant removal) )    HPI    March 28, 2024:  59-year-old white female, who was referred by Dr. Farzad Smart, for a surgical wound right breast.  I have reviewed her history.  Basically, she underwent lumpectomy right breast for carcinoma in 2017.  Several years later, she decided to have bilateral breast implants.  There was a complication with a right breast implant, in the implant had to be removed several months ago.  She has been left with an open wound.  The left breast implant is fine at this time.  She has received chemotherapy and radiation therapy, and there is no evidence of recurrence of carcinoma.  At this time, the wound has been improving slowly.  She would like to get the wound healed, and her plan is to have a right breast implant placed again.      Review of Systems   Constitutional: Negative.    Respiratory: Negative.     Cardiovascular:  Negative.    Gastrointestinal: Negative.    Skin:         As documented in the HPI.   All other systems reviewed and are negative.        Objective:      Vitals:    03/28/24 1053   BP: (!) 155/66   Pulse: 87   Resp: 18     Physical Exam  Vitals and nursing note reviewed.   Constitutional:       Appearance: Normal appearance.   Cardiovascular:      Rate and Rhythm: Normal rate.   Pulmonary:      Effort: Pulmonary effort is normal.   Skin:     General: Skin is warm and dry.      Comments: There is an open wound at the bottom of the right breast, with good granulation tissue.  There is some undermining.  There is no slough present.  I did only a mechanical debridement today.  She is not a candidate for a wound VAC, as the wound does not measure enough depth.   Neurological:      Mental Status: She is alert.              Altered Skin Integrity 03/28/24 1059 Right lower Breast #1 (Active)   03/28/24 1059   Altered Skin Integrity Present on Admission - Did Patient arrive to the hospital with altered skin?:    Side: Right   Orientation: lower   Location: Breast   Wound Number: #1   Is this injury device related?: No   Primary Wound Type:    Description of Altered Skin Integrity:    Ankle-Brachial Index:    Pulses:    Removal Indication and Assessment:    Wound Outcome:    (Retired) Wound Length (cm):    (Retired) Wound Width (cm):    (Retired) Depth (cm):    Wound Description (Comments):    Removal Indications:    Dressing Appearance Moist drainage 03/28/24 1059   Drainage Amount Moderate 03/28/24 1059   Drainage Characteristics/Odor Serosanguineous;Malodorous 03/28/24 1059   Appearance Pink;Slough;Moist 03/28/24 1059   Tissue loss description Full thickness 03/28/24 1059   Periwound Area Intact 03/28/24 1059   Wound Edges Open 03/28/24 1059   Wound Length (cm) 0.5 cm 03/28/24 1059   Wound Width (cm) 2.5 cm 03/28/24 1059   Wound Depth (cm) 0.2 cm 03/28/24 1059   Wound Volume (cm^3) 0.25 cm^3 03/28/24 1059   Wound Surface  "Area (cm^2) 1.25 cm^2 03/28/24 1059   Undermining (depth (cm)/location) 1.0 cm @10-2 o'clock 03/28/24 1059   Care Cleansed with:;Wound cleanser 03/28/24 1059   Dressing Applied 03/28/24 1059   Dressing Change Due 03/30/24 03/28/24 1059      03/28/24     Right breast   (  ML  Assessment:           ICD-10-CM ICD-9-CM   1. Open wound of right breast with complication, initial encounter  S21.001A 879.1           Procedures:       [] Yes [x] No   I & D performed  [] Yes [x] No   Excisional debridement performed  [] Yes [x] No   Selective debridement performed           [x] Yes [] No   Mechanical debridement performed         [] Yes [] No  Silver nitrate applied                                     [] Yes [] No  Labs ordered this visit                                  [] Yes [] No   Imaging ordered this visit                           [] Yes [] No   Tissue pathology and/or culture taken             MEDICATIONS    Current Outpatient Medications:     cholecalciferol, vitamin D3, (VITAMIN D3) 25 mcg (1,000 unit) capsule, Take 1,000 Units by mouth once daily., Disp: , Rfl:     HYDROcodone-acetaminophen (NORCO) 7.5-325 mg per tablet, TAKE 1 TO 2 TABLETS BY MOUTH EVERY 4 TO 6 HOURS AS NEEDED FOR 7 DAYS, Disp: , Rfl:     montelukast (SINGULAIR) 10 mg tablet, Take 10 mg by mouth every evening., Disp: , Rfl:     pantoprazole (PROTONIX) 40 MG tablet, TAKE ONE TABLET BY MOUTH EVERY DAY *NEEDS APPT FOR FURTHER REFILLS*, Disp: , Rfl:    Review of patient's allergies indicates:   Allergen Reactions    Sulfa (sulfonamide antibiotics)        DIAGNOSTICS      Labs/Scans/Micro:    CBC:   Lab Results   Component Value Date    WBC 11.99 (H) 12/21/2023    HGB 13.7 12/21/2023    HCT 42.3 12/21/2023    MCV 94.2 (H) 12/21/2023     12/21/2023       Sedimentation rate: No results found for: "SEDRATE" C-reactive protein: No results found for: "CRP" Chemistry: [unfilled]  HBA1C: No components found for: "HBA1C"    Ankle Brachial Index: No " "results found for this or any previous visit.      Imaging:    Plain film: No results found for this or any previous visit.    MRI: No results found for this or any previous visit.    Bone Scan: No results found for this or any previous visit.    Vascular studies:     Microbiology: No results found for: "MICRO"      HOME HEALTH AGENCY:  NONE   WOUND CARE ORDERS:  Apply collagen every other day.    Follow up in about 1 week (around 4/4/2024) for Right breast wound , Provider Visit.       "

## 2024-04-05 ENCOUNTER — HOSPITAL ENCOUNTER (OUTPATIENT)
Dept: WOUND CARE | Facility: HOSPITAL | Age: 59
Discharge: HOME | End: 2024-04-05
Attending: FAMILY MEDICINE
Payer: MEDICARE

## 2024-04-05 VITALS
DIASTOLIC BLOOD PRESSURE: 75 MMHG | WEIGHT: 86 LBS | BODY MASS INDEX: 15.83 KG/M2 | HEART RATE: 76 BPM | HEIGHT: 62 IN | SYSTOLIC BLOOD PRESSURE: 187 MMHG | RESPIRATION RATE: 19 BRPM

## 2024-04-05 DIAGNOSIS — S21.001D OPEN WOUND OF RIGHT BREAST WITH COMPLICATION, SUBSEQUENT ENCOUNTER: Primary | ICD-10-CM

## 2024-04-05 PROCEDURE — 27000999 HC MEDICAL RECORD PHOTO DOCUMENTATION

## 2024-04-05 PROCEDURE — 99213 OFFICE O/P EST LOW 20 MIN: CPT

## 2024-04-05 NOTE — PROGRESS NOTES
NOTES:  Patient has a hx of breast cancer, 2017, lumpectomy and lymphnode removed. Patient underwent chemo and radiation following procedure. 01/2023 patient decided to get implants done with Dr. Joy at [facility]. Surgery went well but after a while the implant started to come out of right breast. Patient states doctor told her this was due to scar tissue. Patient had second surgery done 08/2023 to remove scar tissue and have another implant placed. 2 month follow up from surgery all was well. Patient ended up at ER in [place] at the end of 10/2023 due to drainage and pain, opening at incision site of right breast. Patient was started on IV abx and sent home on oral. Patient followed up with surgeon 11/2023, implant removed in office. Patient notes cauterization done in surgeon office every visit, healing since initial implant removal. Patient has since been covering and keeping a dry dressing over the wound.   Subjective:       Patient ID: Sarah Hagen is a 59 y.o. female.    Chief Complaint: Non-healing Wound Follow Up (Right breast (augmentation/implant removal))    HPI    April 5th, 2024:  59-year-old white female, who is here for follow up of the open wound on her right breast.  She has been using collagen every other day.  The wound looks better today, with some new skin growth inferiorly.  There is no slough present.    March 28, 2024:  59-year-old white female, who was referred by Dr. Farzad Smart, for a surgical wound right breast.  I have reviewed her history.  Basically, she underwent lumpectomy right breast for carcinoma in 2017.  Several years later, she decided to have bilateral breast implants.  There was a complication with a right breast implant, in the implant had to be removed several months ago.  She has been left with an open wound.  The left breast implant is fine at this time.  She has received chemotherapy and radiation therapy, and there is no evidence of recurrence  of carcinoma.  At this time, the wound has been improving slowly.  She would like to get the wound healed, and her plan is to have a right breast implant placed again.      Review of Systems   Constitutional: Negative.    Respiratory: Negative.     Cardiovascular: Negative.    Gastrointestinal: Negative.    Skin:         As documented in the HPI.   All other systems reviewed and are negative.        Objective:      Vitals:    04/05/24 1059   BP: (!) 187/75   Pulse: 76   Resp: 19     Physical Exam  Vitals and nursing note reviewed.   Constitutional:       Appearance: Normal appearance.   Cardiovascular:      Rate and Rhythm: Normal rate.   Pulmonary:      Effort: Pulmonary effort is normal.   Skin:     General: Skin is warm and dry.      Comments: The right breast open wound appears slightly improved today.  There is no new epithelial tissue inferiorly.  I did a mechanical debridement, as there is no obvious slough.  There still some undermining.   Neurological:      Mental Status: She is alert.              Altered Skin Integrity 03/28/24 1059 Right lower Breast #1 (Active)   03/28/24 1059   Altered Skin Integrity Present on Admission - Did Patient arrive to the hospital with altered skin?:    Side: Right   Orientation: lower   Location: Breast   Wound Number: #1   Is this injury device related?: No   Primary Wound Type:    Description of Altered Skin Integrity:    Ankle-Brachial Index:    Pulses:    Removal Indication and Assessment:    Wound Outcome:    (Retired) Wound Length (cm):    (Retired) Wound Width (cm):    (Retired) Depth (cm):    Wound Description (Comments):    Removal Indications:    Dressing Appearance Moist drainage 04/05/24 1100   Drainage Amount Moderate 04/05/24 1100   Drainage Characteristics/Odor Serosanguineous 04/05/24 1100   Appearance Moist;Red;Smooth 04/05/24 1100   Tissue loss description Full thickness 04/05/24 1100   Periwound Area Intact;Moist 04/05/24 1100   Wound Edges Open 04/05/24  1100   Wound Length (cm) 0.5 cm 04/05/24 1100   Wound Width (cm) 2 cm 04/05/24 1100   Wound Depth (cm) 0.2 cm 04/05/24 1100   Wound Volume (cm^3) 0.2 cm^3 04/05/24 1100   Wound Surface Area (cm^2) 1 cm^2 04/05/24 1100   Undermining (depth (cm)/location) 0.7 cm @10-2 o'clock 04/05/24 1100   Care Cleansed with:;Wound cleanser 04/05/24 1100   Dressing Applied;Other (comment) 04/05/24 1100   Dressing Change Due 04/06/24 04/05/24 1100      4/5/24    Right breast  Collagen, silver alginate, bandage   CT    Assessment:           ICD-10-CM ICD-9-CM   1. Open wound of right breast with complication, subsequent encounter  S21.001D V58.89     879.1             Procedures:     Mechanical     [] Yes [x] No   I & D performed  [] Yes [x] No   Excisional debridement performed  [] Yes [x] No   Selective debridement performed           [x] Yes [] No   Mechanical debridement performed         [] Yes [] No  Silver nitrate applied                                     [] Yes [] No  Labs ordered this visit                                  [] Yes [] No   Imaging ordered this visit                           [] Yes [] No   Tissue pathology and/or culture taken             MEDICATIONS    Current Outpatient Medications:     cholecalciferol, vitamin D3, (VITAMIN D3) 25 mcg (1,000 unit) capsule, Take 1,000 Units by mouth once daily., Disp: , Rfl:     HYDROcodone-acetaminophen (NORCO) 7.5-325 mg per tablet, TAKE 1 TO 2 TABLETS BY MOUTH EVERY 4 TO 6 HOURS AS NEEDED FOR 7 DAYS, Disp: , Rfl:     montelukast (SINGULAIR) 10 mg tablet, Take 10 mg by mouth every evening., Disp: , Rfl:     pantoprazole (PROTONIX) 40 MG tablet, TAKE ONE TABLET BY MOUTH EVERY DAY *NEEDS APPT FOR FURTHER REFILLS*, Disp: , Rfl:    Review of patient's allergies indicates:   Allergen Reactions    Sulfa (sulfonamide antibiotics)        DIAGNOSTICS      Labs/Scans/Micro:    CBC:   Lab Results   Component Value Date    WBC 11.99 (H) 12/21/2023    HGB 13.7 12/21/2023    HCT 42.3  "12/21/2023    MCV 94.2 (H) 12/21/2023     12/21/2023       Sedimentation rate: No results found for: "SEDRATE" C-reactive protein: No results found for: "CRP" Chemistry: [unfilled]  HBA1C: No components found for: "HBA1C"    Ankle Brachial Index: No results found for this or any previous visit.      Imaging:    Plain film: No results found for this or any previous visit.    MRI: No results found for this or any previous visit.    Bone Scan: No results found for this or any previous visit.    Vascular studies:     Microbiology: No results found for: "MICRO"      HOME HEALTH AGENCY:  NONE   WOUND CARE ORDERS:  Right breast wound: Cleanse with wound cleanser, apply collagen, silver alginate, and bandage to be changed every other day    Follow up in 1 week (on 4/12/2024) for right breast.       "

## 2024-04-12 ENCOUNTER — HOSPITAL ENCOUNTER (OUTPATIENT)
Dept: WOUND CARE | Facility: HOSPITAL | Age: 59
Discharge: HOME | End: 2024-04-12
Attending: FAMILY MEDICINE
Payer: MEDICARE

## 2024-04-12 VITALS
BODY MASS INDEX: 15.83 KG/M2 | DIASTOLIC BLOOD PRESSURE: 72 MMHG | SYSTOLIC BLOOD PRESSURE: 162 MMHG | HEART RATE: 82 BPM | RESPIRATION RATE: 18 BRPM | HEIGHT: 62 IN | WEIGHT: 86 LBS

## 2024-04-12 DIAGNOSIS — S21.001D OPEN WOUND OF RIGHT BREAST WITH COMPLICATION, SUBSEQUENT ENCOUNTER: Primary | ICD-10-CM

## 2024-04-12 PROCEDURE — 27000999 HC MEDICAL RECORD PHOTO DOCUMENTATION

## 2024-04-12 PROCEDURE — 99212 OFFICE O/P EST SF 10 MIN: CPT

## 2024-04-12 NOTE — PROGRESS NOTES
Subjective:       Patient ID: Sarah Hagen is a 59 y.o. female.    Chief Complaint: Non-healing Wound Follow Up (Right breast - augmentation/implant removal)    Hospitals in [place]    April 12, 2024:  59-year-old white female, who is here for follow up of the open inferior breast wound.  The wound is looking improved today, with less undermining.  She has been using collagen every other day.  There is no slough present.    April 5th, 2024:  59-year-old white female, who is here for follow up of the open wound on her right breast.  She has been using collagen every other day.  The wound looks better today, with some new skin growth inferiorly.  There is no slough present.    March 28, 2024:  59-year-old white female, who was referred by Dr. Farzad Smart, for a surgical wound right breast.  I have reviewed her history.  Basically, she underwent lumpectomy right breast for carcinoma in 2017.  Several years later, she decided to have bilateral breast implants.  There was a complication with a right breast implant, in the implant had to be removed several months ago.  She has been left with an open wound.  The left breast implant is fine at this time.  She has received chemotherapy and radiation therapy, and there is no evidence of recurrence of carcinoma.  At this time, the wound has been improving slowly.  She would like to get the wound healed, and her plan is to have a right breast implant placed again.    Review of Systems   Constitutional: Negative.    Respiratory: Negative.     Cardiovascular: Negative.    Gastrointestinal: Negative.    Skin:         As documented in the HPI.   All other systems reviewed and are negative.        Objective:      Vitals:    04/12/24 1023   BP: (!) 162/72   Pulse: 82   Resp: 18     Physical Exam  Vitals and nursing note reviewed.   Constitutional:       Appearance: Normal appearance.   Cardiovascular:      Rate and Rhythm: Normal rate.   Pulmonary:      Effort: Pulmonary effort is  normal.   Skin:     General: Skin is warm and dry.      Comments: The wound on the inferior breast wall is measuring better.  I did a mechanical debridement today.  There is less undermining.  The wound bed appears clean, with good granulation.   Neurological:      Mental Status: She is alert.            Altered Skin Integrity 03/28/24 1059 Right lower Breast #1 (Active)   03/28/24 1059   Altered Skin Integrity Present on Admission - Did Patient arrive to the hospital with altered skin?:    Side: Right   Orientation: lower   Location: Breast   Wound Number: #1   Is this injury device related?: No   Primary Wound Type:    Description of Altered Skin Integrity:    Ankle-Brachial Index:    Pulses:    Removal Indication and Assessment:    Wound Outcome:    (Retired) Wound Length (cm):    (Retired) Wound Width (cm):    (Retired) Depth (cm):    Wound Description (Comments):    Removal Indications:    Dressing Appearance Moist drainage 04/12/24 1024   Drainage Amount Moderate 04/12/24 1024   Drainage Characteristics/Odor Serosanguineous 04/12/24 1024   Appearance Pink;Red;Moist 04/12/24 1024   Tissue loss description Full thickness 04/12/24 1024   Periwound Area Intact 04/12/24 1024   Wound Edges Open 04/12/24 1024   Wound Length (cm) 0.5 cm 04/12/24 1024   Wound Width (cm) 2 cm 04/12/24 1024   Wound Depth (cm) 0.2 cm 04/12/24 1024   Wound Volume (cm^3) 0.2 cm^3 04/12/24 1024   Wound Surface Area (cm^2) 1 cm^2 04/12/24 1024   Undermining (depth (cm)/location) 0.2 cm from 10-2 oclock 04/12/24 1024   Care Wound cleanser 04/12/24 1024   Dressing Applied 04/12/24 1024   Dressing Change Due 04/14/24 04/12/24 1024      04/05/24    Right breast    04/12/24    Right breast   Collagen, silver alginate, bandage   ML  Assessment:           ICD-10-CM ICD-9-CM   1. Open wound of right breast with complication, subsequent encounter  S21.001D V58.89     879.1         Procedures:       [] Yes [] No   I & D performed  [] Yes [] No  "  Excisional debridement performed  [] Yes [] No   Selective debridement performed           [x] Yes [] No   Mechanical debridement performed         [] Yes [] No  Silver nitrate applied                                     [] Yes [] No  Labs ordered this visit                                  [] Yes [] No   Imaging ordered this visit                           [] Yes [] No   Tissue pathology and/or culture taken           MEDICATIONS    Current Outpatient Medications:     cholecalciferol, vitamin D3, (VITAMIN D3) 25 mcg (1,000 unit) capsule, Take 1,000 Units by mouth once daily., Disp: , Rfl:     HYDROcodone-acetaminophen (NORCO) 7.5-325 mg per tablet, TAKE 1 TO 2 TABLETS BY MOUTH EVERY 4 TO 6 HOURS AS NEEDED FOR 7 DAYS, Disp: , Rfl:     montelukast (SINGULAIR) 10 mg tablet, Take 10 mg by mouth every evening., Disp: , Rfl:     pantoprazole (PROTONIX) 40 MG tablet, TAKE ONE TABLET BY MOUTH EVERY DAY *NEEDS APPT FOR FURTHER REFILLS*, Disp: , Rfl:    Review of patient's allergies indicates:   Allergen Reactions    Sulfa (sulfonamide antibiotics)        DIAGNOSTICS      Labs/Scans/Micro:    CBC:   Lab Results   Component Value Date    WBC 11.99 (H) 12/21/2023    HGB 13.7 12/21/2023    HCT 42.3 12/21/2023    MCV 94.2 (H) 12/21/2023     12/21/2023       Sedimentation rate: No results found for: "SEDRATE" C-reactive protein: No results found for: "CRP" Chemistry: [unfilled]  HBA1C: No components found for: "HBA1C"    Ankle Brachial Index: No results found for this or any previous visit.      Imaging:    Plain film: No results found for this or any previous visit.    MRI: No results found for this or any previous visit.    Bone Scan: No results found for this or any previous visit.    Vascular studies:     Microbiology: No results found for: "MICRO"      HOME HEALTH AGENCY:  NONE   WOUND CARE ORDERS:  Right breast wound: Cleanse with wound cleanser, apply collagen, silver alginate, and bandage to be changed every " other day      Follow up in about 2 weeks (around 4/26/2024) for Right breast wound , Provider Visit.

## 2024-04-26 ENCOUNTER — HOSPITAL ENCOUNTER (OUTPATIENT)
Dept: WOUND CARE | Facility: HOSPITAL | Age: 59
Discharge: HOME | End: 2024-04-26
Attending: FAMILY MEDICINE
Payer: MEDICARE

## 2024-04-26 VITALS
SYSTOLIC BLOOD PRESSURE: 159 MMHG | RESPIRATION RATE: 18 BRPM | HEIGHT: 62 IN | DIASTOLIC BLOOD PRESSURE: 70 MMHG | BODY MASS INDEX: 15.83 KG/M2 | WEIGHT: 86 LBS | HEART RATE: 92 BPM

## 2024-04-26 DIAGNOSIS — S21.001D OPEN WOUND OF RIGHT BREAST WITH COMPLICATION, SUBSEQUENT ENCOUNTER: Primary | ICD-10-CM

## 2024-04-26 PROCEDURE — 99213 OFFICE O/P EST LOW 20 MIN: CPT

## 2024-04-26 PROCEDURE — 27000999 HC MEDICAL RECORD PHOTO DOCUMENTATION

## 2024-04-26 NOTE — PROGRESS NOTES
NOTES:  Nothing new at this time   Subjective:       Patient ID: Sarah Hagen is a 59 y.o. female.    Chief Complaint: Non-healing Wound Follow Up ( (Right breast - augmentation/implant removal)/ /)    HPI    April 26, 2024:  59-year-old white female, who is here for follow up of the open wound to the right inferior breast.  The wound appears to be stalled at this time.  There is no obvious secondary infection.  There is no slough.  There are still 2 layers of skin, on top of the wound, which are healed, but the remainder of the wound is unhealed.    April 12, 2024:  59-year-old white female, who is here for follow up of the open inferior breast wound.  The wound is looking improved today, with less undermining.  She has been using collagen every other day.  There is no slough present.    April 5th, 2024:  59-year-old white female, who is here for follow up of the open wound on her right breast.  She has been using collagen every other day.  The wound looks better today, with some new skin growth inferiorly.  There is no slough present.    March 28, 2024:  59-year-old white female, who was referred by Dr. Farzad Smart, for a surgical wound right breast.  I have reviewed her history.  Basically, she underwent lumpectomy right breast for carcinoma in 2017.  Several years later, she decided to have bilateral breast implants.  There was a complication with a right breast implant, in the implant had to be removed several months ago.  She has been left with an open wound.  The left breast implant is fine at this time.  She has received chemotherapy and radiation therapy, and there is no evidence of recurrence of carcinoma.  At this time, the wound has been improving slowly.  She would like to get the wound healed, and her plan is to have a right breast implant placed again.    Review of Systems   Constitutional: Negative.    Respiratory: Negative.     Cardiovascular: Negative.    Gastrointestinal: Negative.     Skin:         As documented in the HPI.   All other systems reviewed and are negative.        Objective:      Vitals:    04/26/24 0951   BP: (!) 159/70   Pulse: 92   Resp: 18     Physical Exam  Vitals and nursing note reviewed.   Constitutional:       Appearance: Normal appearance.   Cardiovascular:      Rate and Rhythm: Normal rate.   Pulmonary:      Effort: Pulmonary effort is normal.   Skin:     General: Skin is warm and dry.      Comments: The wound appears to be stalled.  I did a mechanical debridement, as there is no slough present.  There is still some undermining.   Neurological:      Mental Status: She is alert.            Altered Skin Integrity 03/28/24 1059 Right lower Breast #1 (Active)   03/28/24 1059   Altered Skin Integrity Present on Admission - Did Patient arrive to the hospital with altered skin?:    Side: Right   Orientation: lower   Location: Breast   Wound Number: #1   Is this injury device related?: No   Primary Wound Type:    Description of Altered Skin Integrity:    Ankle-Brachial Index:    Pulses:    Removal Indication and Assessment:    Wound Outcome:    (Retired) Wound Length (cm):    (Retired) Wound Width (cm):    (Retired) Depth (cm):    Wound Description (Comments):    Removal Indications:    Dressing Appearance Moist drainage 04/26/24 0953   Drainage Amount Scant 04/26/24 0953   Drainage Characteristics/Odor Serosanguineous 04/26/24 0953   Appearance Pink;Dry 04/26/24 0953   Periwound Area Dry 04/26/24 0953   Wound Length (cm) 0.4 cm 04/26/24 0953   Wound Width (cm) 1.8 cm 04/26/24 0953   Wound Depth (cm) 0.2 cm 04/26/24 0953   Wound Volume (cm^3) 0.144 cm^3 04/26/24 0953   Wound Surface Area (cm^2) 0.72 cm^2 04/26/24 0953   Care Cleansed with:;Wound cleanser 04/26/24 0953   Dressing Applied 04/26/24 0953   Dressing Change Due 04/28/24 04/26/24 0953      04/12/24    Right breast   Collagen, silver alginate, bandage     4/26/24    Right breast (pre)  (Collagen, silver alginate, island  "dressing)  Assessment:           ICD-10-CM ICD-9-CM   1. Open wound of right breast with complication, subsequent encounter  S21.001D V58.89     879.1           Procedures:   Mechanical only    [] Yes [x] No   I & D performed  [] Yes [x] No   Excisional debridement performed  [] Yes [x] No   Selective debridement performed           [x] Yes [] No   Mechanical debridement performed         [] Yes [x] No  Silver nitrate applied                                     [] Yes [] No  Labs ordered this visit                                  [] Yes [] No   Imaging ordered this visit                           [] Yes [] No   Tissue pathology and/or culture taken           MEDICATIONS    Current Outpatient Medications:     cholecalciferol, vitamin D3, (VITAMIN D3) 25 mcg (1,000 unit) capsule, Take 1,000 Units by mouth once daily., Disp: , Rfl:     HYDROcodone-acetaminophen (NORCO) 7.5-325 mg per tablet, TAKE 1 TO 2 TABLETS BY MOUTH EVERY 4 TO 6 HOURS AS NEEDED FOR 7 DAYS, Disp: , Rfl:     montelukast (SINGULAIR) 10 mg tablet, Take 10 mg by mouth every evening., Disp: , Rfl:     pantoprazole (PROTONIX) 40 MG tablet, TAKE ONE TABLET BY MOUTH EVERY DAY *NEEDS APPT FOR FURTHER REFILLS*, Disp: , Rfl:    Review of patient's allergies indicates:   Allergen Reactions    Sulfa (sulfonamide antibiotics)        DIAGNOSTICS      Labs/Scans/Micro:    CBC:   Lab Results   Component Value Date    WBC 11.99 (H) 12/21/2023    HGB 13.7 12/21/2023    HCT 42.3 12/21/2023    MCV 94.2 (H) 12/21/2023     12/21/2023       Sedimentation rate: No results found for: "SEDRATE" C-reactive protein: No results found for: "CRP" Chemistry: [unfilled]  HBA1C: No components found for: "HBA1C"    Ankle Brachial Index: No results found for this or any previous visit.      Imaging:    Plain film: No results found for this or any previous visit.    MRI: No results found for this or any previous visit.    Bone Scan: No results found for this or any previous " "visit.    Vascular studies:     Microbiology: No results found for: "MICRO"      HOME HEALTH AGENCY:  NONE   WOUND CARE ORDERS:  Right breast wound: Cleanse with wound cleanser, apply collagen, silver alginate, and bandage to be changed every other day    She might be a candidate for a skin substitute.    Follow up in 2 weeks (on 5/10/2024) for breast.       "

## 2024-05-10 ENCOUNTER — HOSPITAL ENCOUNTER (OUTPATIENT)
Dept: WOUND CARE | Facility: HOSPITAL | Age: 59
Discharge: HOME | End: 2024-05-10
Attending: FAMILY MEDICINE
Payer: MEDICARE

## 2024-05-10 VITALS
DIASTOLIC BLOOD PRESSURE: 70 MMHG | BODY MASS INDEX: 15.83 KG/M2 | HEIGHT: 62 IN | RESPIRATION RATE: 18 BRPM | SYSTOLIC BLOOD PRESSURE: 163 MMHG | WEIGHT: 86 LBS | HEART RATE: 89 BPM

## 2024-05-10 DIAGNOSIS — S21.001D OPEN WOUND OF RIGHT BREAST WITH COMPLICATION, SUBSEQUENT ENCOUNTER: Primary | ICD-10-CM

## 2024-05-10 PROCEDURE — 99212 OFFICE O/P EST SF 10 MIN: CPT | Mod: 25

## 2024-05-10 PROCEDURE — 97597 DBRDMT OPN WND 1ST 20 CM/<: CPT

## 2024-05-10 PROCEDURE — 27000999 HC MEDICAL RECORD PHOTO DOCUMENTATION

## 2024-05-10 NOTE — PROGRESS NOTES
Subjective:       Patient ID: Sarah Hagen is a 59 y.o. female.    Chief Complaint: Wound Care (Right breast - augmentation/implant removal)    HPI    May 10, 2024:  59-year-old white female, who is here for follow up of the open wound to the right lower breast.  The wound is looking improved today, there may be no subcutaneous tissue exposed.  This appears to be a layer of skin, with indentation.  There is a sleeve of skin which appears as a bridge over the wound bed.    April 26, 2024:  59-year-old white female, who is here for follow up of the open wound to the right inferior breast.  The wound appears to be stalled at this time.  There is no obvious secondary infection.  There is no slough.  There are still 2 layers of skin, on top of the wound, which are healed, but the remainder of the wound is unhealed.    April 12, 2024:  59-year-old white female, who is here for follow up of the open inferior breast wound.  The wound is looking improved today, with less undermining.  She has been using collagen every other day.  There is no slough present.    April 5th, 2024:  59-year-old white female, who is here for follow up of the open wound on her right breast.  She has been using collagen every other day.  The wound looks better today, with some new skin growth inferiorly.  There is no slough present.    March 28, 2024:  59-year-old white female, who was referred by Dr. Farzad Smart, for a surgical wound right breast.  I have reviewed her history.  Basically, she underwent lumpectomy right breast for carcinoma in 2017.  Several years later, she decided to have bilateral breast implants.  There was a complication with a right breast implant, in the implant had to be removed several months ago.  She has been left with an open wound.  The left breast implant is fine at this time.  She has received chemotherapy and radiation therapy, and there is no evidence of recurrence of carcinoma.  At this time, the  wound has been improving slowly.  She would like to get the wound healed, and her plan is to have a right breast implant placed again.    Review of Systems   Constitutional: Negative.    Respiratory: Negative.     Cardiovascular: Negative.    Gastrointestinal: Negative.    Skin:         As documented in the HPI.   All other systems reviewed and are negative.        Objective:      Vitals:    05/10/24 0943   BP: (!) 163/70   Pulse: 89   Resp: 18     Physical Exam  Vitals and nursing note reviewed.   Constitutional:       Appearance: Normal appearance.   Cardiovascular:      Rate and Rhythm: Normal rate.   Pulmonary:      Effort: Pulmonary effort is normal.   Skin:     General: Skin is warm and dry.      Comments: Easily removed a small bridge of skin overlying the original wound.  She tolerated this without anesthesia.  I did not do any debriding today.  The wound itself appears to be covered with skin, no subcutaneous tissue exposed.   Neurological:      Mental Status: She is alert.            Altered Skin Integrity 03/28/24 1059 Right lower Breast #1 (Active)   03/28/24 1059   Altered Skin Integrity Present on Admission - Did Patient arrive to the hospital with altered skin?:    Side: Right   Orientation: lower   Location: Breast   Wound Number: #1   Is this injury device related?: No   Primary Wound Type:    Description of Altered Skin Integrity:    Ankle-Brachial Index:    Pulses:    Removal Indication and Assessment:    Wound Outcome:    (Retired) Wound Length (cm):    (Retired) Wound Width (cm):    (Retired) Depth (cm):    Wound Description (Comments):    Removal Indications:    Dressing Appearance Dry 05/10/24 0956   Drainage Amount None 05/10/24 0956   Appearance Dry;Pink 05/10/24 0956   Tissue loss description Partial thickness 05/10/24 0956   Periwound Area Intact;[place] 05/10/24 0956   Wound Edges Rolled/closed 05/10/24 0956   Wound Length (cm) 0.2 cm 05/10/24 0956   Wound Width (cm) 0.2 cm 05/10/24 0956  "  Wound Depth (cm) 0.1 cm 05/10/24 0956   Wound Volume (cm^3) 0.004 cm^3 05/10/24 0956   Wound Surface Area (cm^2) 0.04 cm^2 05/10/24 0956   Care Cleansed with:;Wound cleanser 05/10/24 0956   Dressing Applied 05/10/24 0956   Dressing Change Due 05/11/24 05/10/24 0956      04/26/24    Right breast (pre)    05/10/24      Right breast - pre hailey.                                       Right breast - post hailey  (4x4 gauze)   ML  Assessment:           ICD-10-CM ICD-9-CM   1. Open wound of right breast with complication, subsequent encounter  S21.001D V58.89     879.1           Procedures:   Selective - forceps, scissors     [] Yes [x] No   I & D performed  [] Yes [x] No   Excisional debridement performed  [] Yes [x] No   Selective debridement performed           [x] Yes [] No   Mechanical debridement performed         [] Yes [x] No  Silver nitrate applied                                     [] Yes [] No  Labs ordered this visit                                  [] Yes [] No   Imaging ordered this visit                           [] Yes [] No   Tissue pathology and/or culture taken           MEDICATIONS    Current Outpatient Medications:     cholecalciferol, vitamin D3, (VITAMIN D3) 25 mcg (1,000 unit) capsule, Take 1,000 Units by mouth once daily., Disp: , Rfl:     montelukast (SINGULAIR) 10 mg tablet, Take 10 mg by mouth every evening., Disp: , Rfl:     pantoprazole (PROTONIX) 40 MG tablet, TAKE ONE TABLET BY MOUTH EVERY DAY *NEEDS APPT FOR FURTHER REFILLS*, Disp: , Rfl:    Review of patient's allergies indicates:   Allergen Reactions    Sulfa (sulfonamide antibiotics)        DIAGNOSTICS      Labs/Scans/Micro:    CBC:   Lab Results   Component Value Date    WBC 11.99 (H) 12/21/2023    HGB 13.7 12/21/2023    HCT 42.3 12/21/2023    MCV 94.2 (H) 12/21/2023     12/21/2023       Sedimentation rate: No results found for: "SEDRATE" C-reactive protein: No results found for: "CRP" Chemistry: [unfilled]  HBA1C: No " "components found for: "HBA1C"    Ankle Brachial Index: No results found for this or any previous visit.      Imaging:    Plain film: No results found for this or any previous visit.    MRI: No results found for this or any previous visit.    Bone Scan: No results found for this or any previous visit.    Vascular studies:     Microbiology: No results found for: "MICRO"      HOME HEALTH AGENCY:  NONE   WOUND CARE ORDERS:  Right breast wound: Cleanse with soap and water, apply dry dressing daily.       Follow up in about 2 weeks (around 5/24/2024) for Provider Visit.       She may be able to returned her plastic surgeon within the next month.  "

## 2024-05-24 ENCOUNTER — HOSPITAL ENCOUNTER (OUTPATIENT)
Dept: WOUND CARE | Facility: HOSPITAL | Age: 59
Discharge: HOME | End: 2024-05-24
Attending: FAMILY MEDICINE
Payer: MEDICARE

## 2024-05-24 VITALS
HEART RATE: 83 BPM | RESPIRATION RATE: 19 BRPM | DIASTOLIC BLOOD PRESSURE: 84 MMHG | SYSTOLIC BLOOD PRESSURE: 178 MMHG | HEIGHT: 62 IN | WEIGHT: 86 LBS | BODY MASS INDEX: 15.83 KG/M2

## 2024-05-24 DIAGNOSIS — Z86.000 HISTORY OF CARCINOMA IN SITU OF BREAST: ICD-10-CM

## 2024-05-24 DIAGNOSIS — S21.001D OPEN WOUND OF RIGHT BREAST WITH COMPLICATION, SUBSEQUENT ENCOUNTER: Primary | ICD-10-CM

## 2024-05-24 PROCEDURE — 99212 OFFICE O/P EST SF 10 MIN: CPT

## 2024-05-24 PROCEDURE — 27000999 HC MEDICAL RECORD PHOTO DOCUMENTATION

## 2024-05-24 NOTE — PROGRESS NOTES
NOTES  Appt with [facility] on 5/16/24 - patient unable to receive implant sx and was referred to MD Orozco for surgical consult, appt is on 6/12/24  Subjective:       Patient ID: Sarah Hagen is a 59 y.o. female.    Chief Complaint: Wound Care (Right breast - augmentation/implant removal (HEALED))    HPI    May 24, 2024:  59-year-old white female, who is here for follow up of the open wound to the right lower breast.  She spoke to her plastic surgeon here in [place], and he referred her to MD Orozco next week, for re-application of right breast implant.  She has had breast carcinoma in the past.  He definitely would like to have reconstruction and a implant in the right breast.  The wound has now healed.    May 10, 2024:  59-year-old white female, who is here for follow up of the open wound to the right lower breast.  The wound is looking improved today, there may be no subcutaneous tissue exposed.  This appears to be a layer of skin, with indentation.  There is a sleeve of skin which appears as a bridge over the wound bed.    April 26, 2024:  59-year-old white female, who is here for follow up of the open wound to the right inferior breast.  The wound appears to be stalled at this time.  There is no obvious secondary infection.  There is no slough.  There are still 2 layers of skin, on top of the wound, which are healed, but the remainder of the wound is unhealed.    April 12, 2024:  59-year-old white female, who is here for follow up of the open inferior breast wound.  The wound is looking improved today, with less undermining.  She has been using collagen every other day.  There is no slough present.    April 5th, 2024:  59-year-old white female, who is here for follow up of the open wound on her right breast.  She has been using collagen every other day.  The wound looks better today, with some new skin growth inferiorly.  There is no slough present.    March 28, 2024:  59-year-old  white female, who was referred by Dr. Farzad Smart, for a surgical wound right breast.  I have reviewed her history.  Basically, she underwent lumpectomy right breast for carcinoma in 2017.  Several years later, she decided to have bilateral breast implants.  There was a complication with a right breast implant, in the implant had to be removed several months ago.  She has been left with an open wound.  The left breast implant is fine at this time.  She has received chemotherapy and radiation therapy, and there is no evidence of recurrence of carcinoma.  At this time, the wound has been improving slowly.  She would like to get the wound healed, and her plan is to have a right breast implant placed again.    Review of Systems   Constitutional: Negative.    Respiratory: Negative.     Cardiovascular: Negative.    Gastrointestinal: Negative.    Skin:         As documented in the HPI.   All other systems reviewed and are negative.        Objective:      Vitals:    05/24/24 0830   BP: (!) 178/84   Pulse: 83   Resp: 19     Physical Exam  Vitals and nursing note reviewed.   Constitutional:       Appearance: Normal appearance.   Cardiovascular:      Rate and Rhythm: Normal rate.   Pulmonary:      Effort: Pulmonary effort is normal.   Skin:     General: Skin is warm and dry.      Comments: There is an opened indentation of the right lower breast, which is covered by skin only.  There is no open wound.  She has the breast implant on the left side.   Neurological:      Mental Status: She is alert.             5/24/24    Right breast (HEALED)    Assessment:         ICD-10-CM ICD-9-CM   1. Open wound of right breast with complication, subsequent encounter  S21.001D V58.89     879.1   2. History of carcinoma in situ of breast  Z86.000 V13.89         Procedures:       [] Yes [x] No   I & D performed  [] Yes [x] No   Excisional debridement performed  [] Yes [x] No   Selective debridement performed           [] Yes [x] No  "  Mechanical debridement performed         [] Yes [x] No  Silver nitrate applied                                     [] Yes [] No  Labs ordered this visit                                  [] Yes [] No   Imaging ordered this visit                           [] Yes [] No   Tissue pathology and/or culture taken           MEDICATIONS    Current Outpatient Medications:     cholecalciferol, vitamin D3, (VITAMIN D3) 25 mcg (1,000 unit) capsule, Take 1,000 Units by mouth once daily., Disp: , Rfl:     montelukast (SINGULAIR) 10 mg tablet, Take 10 mg by mouth every evening., Disp: , Rfl:     pantoprazole (PROTONIX) 40 MG tablet, TAKE ONE TABLET BY MOUTH EVERY DAY *NEEDS APPT FOR FURTHER REFILLS*, Disp: , Rfl:    Review of patient's allergies indicates:   Allergen Reactions    Sulfa (sulfonamide antibiotics)        DIAGNOSTICS      Labs/Scans/Micro:    CBC:   Lab Results   Component Value Date    WBC 11.99 (H) 12/21/2023    HGB 13.7 12/21/2023    HCT 42.3 12/21/2023    MCV 94.2 (H) 12/21/2023     12/21/2023       Sedimentation rate: No results found for: "SEDRATE" C-reactive protein: No results found for: "CRP" Chemistry: [unfilled]  HBA1C: No components found for: "HBA1C"    Ankle Brachial Index: No results found for this or any previous visit.      Imaging:    Plain film: No results found for this or any previous visit.    MRI: No results found for this or any previous visit.    Bone Scan: No results found for this or any previous visit.    Vascular studies:     Microbiology: No results found for: "MICRO"      HOME HEALTH AGENCY:  NONE   WOUND CARE ORDERS:    Follow up with MD Orozco.    Follow up if symptoms worsen or fail to improve, for right breast .         "